# Patient Record
Sex: FEMALE | Race: ASIAN | NOT HISPANIC OR LATINO | Employment: OTHER | URBAN - METROPOLITAN AREA
[De-identification: names, ages, dates, MRNs, and addresses within clinical notes are randomized per-mention and may not be internally consistent; named-entity substitution may affect disease eponyms.]

---

## 2017-12-21 ENCOUNTER — HOSPITAL ENCOUNTER (EMERGENCY)
Facility: MEDICAL CENTER | Age: 52
End: 2017-12-21
Attending: EMERGENCY MEDICINE
Payer: MEDICARE

## 2017-12-21 VITALS
BODY MASS INDEX: 31.91 KG/M2 | RESPIRATION RATE: 16 BRPM | DIASTOLIC BLOOD PRESSURE: 84 MMHG | OXYGEN SATURATION: 94 % | HEIGHT: 63 IN | SYSTOLIC BLOOD PRESSURE: 186 MMHG | TEMPERATURE: 96.7 F | HEART RATE: 99 BPM | WEIGHT: 180.12 LBS

## 2017-12-21 DIAGNOSIS — S80.11XA CONTUSION OF RIGHT LOWER LEG, INITIAL ENCOUNTER: ICD-10-CM

## 2017-12-21 PROCEDURE — 99283 EMERGENCY DEPT VISIT LOW MDM: CPT

## 2017-12-22 NOTE — ED NOTES
.Pt D/C to home. VSS. D/C instructions given to patient. Pt educated on pain management techniques.  Pt verbalizes understanding. Pt leaves ED with no acute changes, complaints or concerns. Pt ambulated out with a steady gait and all belongings.

## 2017-12-22 NOTE — DISCHARGE INSTRUCTIONS
Contusion  A contusion is a deep bruise. Contusions happen when an injury causes bleeding under the skin. Signs of bruising include pain, puffiness (swelling), and discolored skin. The contusion may turn blue, purple, or yellow.  HOME CARE   · Put ice on the injured area.  ¨ Put ice in a plastic bag.  ¨ Place a towel between your skin and the bag.  ¨ Leave the ice on for 15-20 minutes, 03-04 times a day.  · Only take medicine as told by your doctor.  · Rest the injured area.  · If possible, raise (elevate) the injured area to lessen puffiness.  GET HELP RIGHT AWAY IF:   · You have more bruising or puffiness.  · You have pain that is getting worse.  · Your puffiness or pain is not helped by medicine.  MAKE SURE YOU:   · Understand these instructions.  · Will watch your condition.  · Will get help right away if you are not doing well or get worse.     This information is not intended to replace advice given to you by your health care provider. Make sure you discuss any questions you have with your health care provider.     Document Released: 06/05/2009 Document Revised: 03/11/2013 Document Reviewed: 10/22/2012  [employer] Patient Education ©2016 [employer]

## 2017-12-22 NOTE — ED NOTES
Pt ambulated in with [facility] and a steady gait with c/o rt lower leg numbness. Pt has good pulses bilaterally, good strength bilaterally, Pt has feeling in foot and knee, but not in the mid tib/fib region. Pt vague with symptoms but stats that the numbness started after hitting her shin on the coffee table. She awoke with numbness in her leg and called 911 because it scared her. Pt in no apparent distress at this time.

## 2017-12-22 NOTE — ED PROVIDER NOTES
ED Provider Note    CHIEF COMPLAINT  Chief Complaint   Patient presents with   • Leg Pain     Rt leg numbness on rt foot from touching it. and it is hard for the pt to move it because it is numb.       HPI  Perla Marie is a 52 y.o. female who presents After bumping her right leg on a coffee table earlier, she went to sleep for about 2 hours and then woke up and was unable to feel her right shin. She states it is not painful. She states that it doesn't hurt to walk. It feels funny when she walks because it is numb. She denies any other symptoms. She admits that she did not take her antihypertensive today. She's not sure if she took it yesterday either.    REVIEW OF SYSTEMS  See HPI for further details.     PAST MEDICAL HISTORY  Past Medical History:   Diagnosis Date   • Psychiatric disorder     depression,schziophrenic, bi-polar       FAMILY HISTORY  No family history on file.    SOCIAL HISTORY  Social History     Social History   • Marital status:      Spouse name: N/A   • Number of children: N/A   • Years of education: N/A     Social History Main Topics   • Smoking status: Never Smoker   • Smokeless tobacco: Never Used   • Alcohol use No   • Drug use: No   • Sexual activity: Not on file     Other Topics Concern   • Not on file     Social History Narrative   • No narrative on file       SURGICAL HISTORY  No past surgical history on file.    CURRENT MEDICATIONS  No current facility-administered medications for this encounter.     Current Outpatient Prescriptions:   •  ibuprofen (MOTRIN) 400 MG Tab, Take 1 Tab by mouth every 6 hours as needed., Disp: 24 Tab, Rfl: 1  •  lisinopril (PRINIVIL) 20 MG Tab, Take 20 mg by mouth every day., Disp: , Rfl:   •  metformin (GLUCOPHAGE) 850 MG Tab, Take 850 mg by mouth 2 times a day, with meals., Disp: , Rfl:   •  risperidone (RISPERDAL) 3 MG Tab, Take 3 mg by mouth 2 times a day., Disp: , Rfl:   •  aripiprazole (ABILIFY) 30 MG tablet, Take 30 mg by mouth every day.,  "Disp: , Rfl:   •  divalproex (DEPAKOTE) 250 MG TBEC, Take 250 mg by mouth every 12 hours., Disp: , Rfl:   •  duloxetine (CYMBALTA) 60 MG CPEP delayed-release capsule, Take 60 mg by mouth every day., Disp: , Rfl:   •  loxapine (LOXITANE) 10 MG capsule, Take 10 mg by mouth 2 Times a Day., Disp: , Rfl:   •  AMBIEN 5 MG PO TABS, Take  by mouth every bedtime. prn, Disp: , Rfl:       ALLERGIES  No Known Allergies    PHYSICAL EXAM  VITAL SIGNS: BP (!) 186/84   Pulse 99   Temp 35.9 °C (96.7 °F)   Resp 16   Ht 1.6 m (5' 3\")   Wt 81.7 kg (180 lb 1.9 oz)   SpO2 94%   BMI 31.91 kg/m²  @GLENYS[[id number]::@   Constitutional: Well developed, Well nourished, No acute respiratory distress, Non-toxic appearance.   HENT: Normocephalic, Atraumatic, Bilateral external ears normal, Oropharynx clear, mucous membranes are moist.  Eyes: Conjunctiva normal, No discharge. No icterus.  Neck: Normal range of motion. Supple.  Skin: Warm, Dry, No erythema, No rash. Intact without ecchymosis  Musculoskeletal: Good range of motion in all major joints. Normal gait. No pain with longitudinal compression through the right tibia. No pain with heel tap. No tenderness to the right fibula or tibia, ankle or knee.  Neurologic: Alert & oriented x 3. Sensation is intact to the right foot and right ankle, right knee, right thigh. There is a bandlike distribution over the middle of the right shin where the patient denies the ability to feel.         COURSE & MEDICAL DECISION MAKING  Pertinent Labs & Imaging studies reviewed. (See chart for details)  Patient is advised that she probably contused nerve and it should come back just fine over the next few days. She has no signs of musculoskeletal abnormality otherwise. She is comfortable going home. She understands that she must take her antihypertensive medication on a regular basis to avoid having spikes in her blood pressure that could cause a stroke. She reports that she does have plenty of medication at " home, she just forgot to take it today.     The patient will return for new or worsening symptoms and is stable at the time of discharge.        DISPOSITION:  Patient will be discharged home in stable condition.    FOLLOW UP:  Amor Barker M.D.  [id number]  [address]  [facility] [id number]  [phone]    Call in 1 week  if not improved      OUTPATIENT MEDICATIONS:  Discharge Medication List as of 12/21/2017  9:03 PM            FINAL IMPRESSION  1. Contusion of right lower leg, initial encounter               Electronically signed by: Emilie Kay, 12/21/2017 8:59 PM

## 2018-12-11 ENCOUNTER — HOSPITAL ENCOUNTER (EMERGENCY)
Facility: MEDICAL CENTER | Age: 53
End: 2018-12-12
Attending: EMERGENCY MEDICINE
Payer: MEDICARE

## 2018-12-11 DIAGNOSIS — R10.32 LLQ ABDOMINAL PAIN: ICD-10-CM

## 2018-12-11 PROCEDURE — 80053 COMPREHEN METABOLIC PANEL: CPT

## 2018-12-11 PROCEDURE — 99285 EMERGENCY DEPT VISIT HI MDM: CPT

## 2018-12-11 PROCEDURE — 81001 URINALYSIS AUTO W/SCOPE: CPT

## 2018-12-11 PROCEDURE — 83690 ASSAY OF LIPASE: CPT

## 2018-12-11 PROCEDURE — 87186 SC STD MICRODIL/AGAR DIL: CPT

## 2018-12-11 PROCEDURE — 87086 URINE CULTURE/COLONY COUNT: CPT

## 2018-12-11 PROCEDURE — 85025 COMPLETE CBC W/AUTO DIFF WBC: CPT

## 2018-12-11 PROCEDURE — 87077 CULTURE AEROBIC IDENTIFY: CPT

## 2018-12-11 PROCEDURE — 84703 CHORIONIC GONADOTROPIN ASSAY: CPT

## 2018-12-11 PROCEDURE — 36415 COLL VENOUS BLD VENIPUNCTURE: CPT

## 2018-12-11 PROCEDURE — 83605 ASSAY OF LACTIC ACID: CPT

## 2018-12-11 ASSESSMENT — PAIN SCALES - GENERAL: PAINLEVEL_OUTOF10: 2

## 2018-12-12 ENCOUNTER — APPOINTMENT (OUTPATIENT)
Dept: RADIOLOGY | Facility: MEDICAL CENTER | Age: 53
End: 2018-12-12
Attending: EMERGENCY MEDICINE
Payer: MEDICARE

## 2018-12-12 VITALS
OXYGEN SATURATION: 96 % | RESPIRATION RATE: 16 BRPM | TEMPERATURE: 96.7 F | HEART RATE: 86 BPM | HEIGHT: 62 IN | BODY MASS INDEX: 35.09 KG/M2 | DIASTOLIC BLOOD PRESSURE: 77 MMHG | SYSTOLIC BLOOD PRESSURE: 134 MMHG | WEIGHT: 190.7 LBS

## 2018-12-12 LAB
ALBUMIN SERPL BCP-MCNC: 4.2 G/DL (ref 3.2–4.9)
ALBUMIN/GLOB SERPL: 1.1 G/DL
ALP SERPL-CCNC: 70 U/L (ref 30–99)
ALT SERPL-CCNC: 20 U/L (ref 2–50)
ANION GAP SERPL CALC-SCNC: 11 MMOL/L (ref 0–11.9)
APPEARANCE UR: ABNORMAL
AST SERPL-CCNC: 21 U/L (ref 12–45)
BACTERIA #/AREA URNS HPF: ABNORMAL /HPF
BASOPHILS # BLD AUTO: 0.6 % (ref 0–1.8)
BASOPHILS # BLD: 0.06 K/UL (ref 0–0.12)
BILIRUB SERPL-MCNC: 0.8 MG/DL (ref 0.1–1.5)
BILIRUB UR QL STRIP.AUTO: NEGATIVE
BUN SERPL-MCNC: 21 MG/DL (ref 8–22)
CALCIUM SERPL-MCNC: 8.7 MG/DL (ref 8.4–10.2)
CHLORIDE SERPL-SCNC: 101 MMOL/L (ref 96–112)
CO2 SERPL-SCNC: 23 MMOL/L (ref 20–33)
COLOR UR: YELLOW
CREAT SERPL-MCNC: 0.94 MG/DL (ref 0.5–1.4)
EOSINOPHIL # BLD AUTO: 0.11 K/UL (ref 0–0.51)
EOSINOPHIL NFR BLD: 1.1 % (ref 0–6.9)
EPI CELLS #/AREA URNS HPF: ABNORMAL /HPF
ERYTHROCYTE [DISTWIDTH] IN BLOOD BY AUTOMATED COUNT: 40 FL (ref 35.9–50)
GLOBULIN SER CALC-MCNC: 3.9 G/DL (ref 1.9–3.5)
GLUCOSE SERPL-MCNC: 248 MG/DL (ref 65–99)
GLUCOSE UR STRIP.AUTO-MCNC: 500 MG/DL
HCG SERPL QL: NEGATIVE
HCT VFR BLD AUTO: 45.7 % (ref 37–47)
HGB BLD-MCNC: 15.3 G/DL (ref 12–16)
IMM GRANULOCYTES # BLD AUTO: 0.08 K/UL (ref 0–0.11)
IMM GRANULOCYTES NFR BLD AUTO: 0.8 % (ref 0–0.9)
KETONES UR STRIP.AUTO-MCNC: 15 MG/DL
LACTATE BLD-SCNC: 3.3 MMOL/L (ref 0.5–2)
LACTATE BLD-SCNC: 3.9 MMOL/L (ref 0.5–2)
LEUKOCYTE ESTERASE UR QL STRIP.AUTO: ABNORMAL
LIPASE SERPL-CCNC: 34 U/L (ref 7–58)
LYMPHOCYTES # BLD AUTO: 4.69 K/UL (ref 1–4.8)
LYMPHOCYTES NFR BLD: 45.1 % (ref 22–41)
MCH RBC QN AUTO: 30.2 PG (ref 27–33)
MCHC RBC AUTO-ENTMCNC: 33.5 G/DL (ref 33.6–35)
MCV RBC AUTO: 90.3 FL (ref 81.4–97.8)
MICRO URNS: ABNORMAL
MONOCYTES # BLD AUTO: 0.6 K/UL (ref 0–0.85)
MONOCYTES NFR BLD AUTO: 5.8 % (ref 0–13.4)
NEUTROPHILS # BLD AUTO: 4.85 K/UL (ref 2–7.15)
NEUTROPHILS NFR BLD: 46.6 % (ref 44–72)
NITRITE UR QL STRIP.AUTO: NEGATIVE
NRBC # BLD AUTO: 0 K/UL
NRBC BLD-RTO: 0 /100 WBC
PH UR STRIP.AUTO: 5.5 [PH]
PLATELET # BLD AUTO: 251 K/UL (ref 164–446)
PMV BLD AUTO: 9.5 FL (ref 9–12.9)
POTASSIUM SERPL-SCNC: 3.5 MMOL/L (ref 3.6–5.5)
PROT SERPL-MCNC: 8.1 G/DL (ref 6–8.2)
PROT UR QL STRIP: 30 MG/DL
RBC # BLD AUTO: 5.06 M/UL (ref 4.2–5.4)
RBC # URNS HPF: ABNORMAL /HPF
RBC UR QL AUTO: ABNORMAL
SODIUM SERPL-SCNC: 135 MMOL/L (ref 135–145)
SP GR UR STRIP.AUTO: 1.02
WBC # BLD AUTO: 10.4 K/UL (ref 4.8–10.8)
WBC #/AREA URNS HPF: ABNORMAL /HPF

## 2018-12-12 PROCEDURE — 700102 HCHG RX REV CODE 250 W/ 637 OVERRIDE(OP): Performed by: EMERGENCY MEDICINE

## 2018-12-12 PROCEDURE — A9270 NON-COVERED ITEM OR SERVICE: HCPCS | Performed by: EMERGENCY MEDICINE

## 2018-12-12 PROCEDURE — 96374 THER/PROPH/DIAG INJ IV PUSH: CPT

## 2018-12-12 PROCEDURE — 74177 CT ABD & PELVIS W/CONTRAST: CPT

## 2018-12-12 PROCEDURE — 700117 HCHG RX CONTRAST REV CODE 255: Performed by: EMERGENCY MEDICINE

## 2018-12-12 PROCEDURE — 700105 HCHG RX REV CODE 258: Performed by: EMERGENCY MEDICINE

## 2018-12-12 PROCEDURE — 700111 HCHG RX REV CODE 636 W/ 250 OVERRIDE (IP): Performed by: EMERGENCY MEDICINE

## 2018-12-12 PROCEDURE — 83605 ASSAY OF LACTIC ACID: CPT

## 2018-12-12 RX ORDER — MORPHINE SULFATE 4 MG/ML
4 INJECTION, SOLUTION INTRAMUSCULAR; INTRAVENOUS ONCE
Status: COMPLETED | OUTPATIENT
Start: 2018-12-12 | End: 2018-12-12

## 2018-12-12 RX ORDER — METRONIDAZOLE 500 MG/1
500 TABLET ORAL 3 TIMES DAILY
Qty: 21 TAB | Refills: 0 | Status: SHIPPED | OUTPATIENT
Start: 2018-12-12 | End: 2018-12-19

## 2018-12-12 RX ORDER — METRONIDAZOLE 500 MG/1
500 TABLET ORAL ONCE
Status: COMPLETED | OUTPATIENT
Start: 2018-12-12 | End: 2018-12-12

## 2018-12-12 RX ORDER — SODIUM CHLORIDE 9 MG/ML
1000 INJECTION, SOLUTION INTRAVENOUS ONCE
Status: COMPLETED | OUTPATIENT
Start: 2018-12-12 | End: 2018-12-12

## 2018-12-12 RX ORDER — CIPROFLOXACIN 500 MG/1
500 TABLET, FILM COATED ORAL 2 TIMES DAILY
Qty: 14 TAB | Refills: 0 | Status: SHIPPED | OUTPATIENT
Start: 2018-12-12 | End: 2018-12-19

## 2018-12-12 RX ORDER — CIPROFLOXACIN 500 MG/1
500 TABLET, FILM COATED ORAL ONCE
Status: COMPLETED | OUTPATIENT
Start: 2018-12-12 | End: 2018-12-12

## 2018-12-12 RX ADMIN — MORPHINE SULFATE 4 MG: 4 INJECTION INTRAVENOUS at 00:14

## 2018-12-12 RX ADMIN — CIPROFLOXACIN 500 MG: 500 TABLET, FILM COATED ORAL at 02:03

## 2018-12-12 RX ADMIN — METRONIDAZOLE 500 MG: 500 TABLET ORAL at 02:03

## 2018-12-12 RX ADMIN — SODIUM CHLORIDE 1000 ML: 9 INJECTION, SOLUTION INTRAVENOUS at 00:12

## 2018-12-12 RX ADMIN — IOHEXOL 100 ML: 350 INJECTION, SOLUTION INTRAVENOUS at 01:29

## 2018-12-12 ASSESSMENT — PAIN SCALES - GENERAL
PAINLEVEL_OUTOF10: 4
PAINLEVEL_OUTOF10: 0

## 2018-12-12 NOTE — ED PROVIDER NOTES
"ED Provider Note    CHIEF COMPLAINT  Chief Complaint   Patient presents with   • LLQ Pain     started around 2100 tonight, described as \"sharp\"       HPI  Perla Marie is a 53 y.o. female who presents with a chief complaint of left lower quadrant pain.  She reports she was in her baseline state of health until approximately 9:00 tonight, 2 hours prior to arrival, when she developed sharp left lower quadrant pain.  The pain does not radiate but is associated with dysuria and hematuria.  She has not taken any medication for her symptoms.  She denies any fevers or chills, chest pain or shortness of breath, nausea or vomiting, diarrhea or constipation.  She has not had a menstrual period in 2 years but is concerned that she may be pregnant.  No vaginal discharge.  No melena or hematochezia.    REVIEW OF SYSTEMS  See HPI for further details.  Abdominal pain.  Dysuria.  All other systems are negative.     PAST MEDICAL HISTORY   has a past medical history of Diabetes (HCC); Hypertension; and Psychiatric disorder.    SOCIAL HISTORY  Social History     Social History Main Topics   • Smoking status: Never Smoker   • Smokeless tobacco: Never Used   • Alcohol use No   • Drug use: No   • Sexual activity: Not on file       SURGICAL HISTORY  patient denies any surgical history    CURRENT MEDICATIONS  Home Medications    **Home medications have not yet been reviewed for this encounter**         ALLERGIES  No Known Allergies    PHYSICAL EXAM  VITAL SIGNS: BP (!) 178/79   Pulse 87   Temp 35.9 °C (96.7 °F) (Oral)   Resp 12   Ht 1.575 m (5' 2\")   Wt 86.5 kg (190 lb 11.2 oz)   SpO2 95%   BMI 34.88 kg/m²    Pulse ox interpretation: I interpret this pulse ox as normal.  Constitutional: Alert in no apparent distress.  HENT: No signs of trauma, Bilateral external ears normal, Nose normal.  Dry mucous membranes.  Eyes: Pupils are equal and reactive, Conjunctiva normal, Non-icteric.   Neck: Normal range of motion, No tenderness, " Supple, No stridor.   Lymphatic: No lymphadenopathy noted.   Cardiovascular: Regular rate and rhythm, no murmurs.   Thorax & Lungs: Normal breath sounds, No respiratory distress, No wheezing, No chest tenderness.   Abdomen: Bowel sounds normal, Soft, left lower quadrant tenderness to palpation, No masses, No pulsatile masses. No peritoneal signs.  Skin: Warm, Dry, No erythema, No rash.   Back: No bony tenderness, left-sided CVA tenderness.   Extremities: Intact distal pulses, No edema, No tenderness, No cyanosis.  Musculoskeletal: Good range of motion in all major joints. No tenderness to palpation or major deformities noted.   Neurologic: Alert, Normal motor function, Normal sensory function, No focal deficits noted.   Psychiatric: Affect normal, Judgment normal, Mood normal.       DIAGNOSTIC STUDIES / PROCEDURES    LABS  CBC  CMP  Lipase  Urinalysis  HCG  Lactic acid x2    RADIOLOGY  CT abdomen pelvis with contrast    COURSE & MEDICAL DECISION MAKING  Pertinent Labs & Imaging studies reviewed. (See chart for details)  This is a 53-year-old female with a history of diabetes and hypertension who is here with a chief complaint of left lower quadrant pain.  Differential diagnosis includes, but is not limited to, diverticulitis, cystitis, colitis, ovarian pathology, less likely ectopic pregnancy.  Patient arrives hypertensive but afebrile with otherwise normal vital signs.  She appears dehydrated with dry mucous membranes but nontoxic.  Her physical exam is remarkable for left lower quadrant tenderness to palpation.  She does have mild left CVA tenderness.  The remainder of her physical exam is unremarkable.  Patient given pain medication and IV fluids started as below.    CBC is without leukocytosis or anemia.  No neutrophilic predominance or bandemia.  Electrolytes are largely normal with a very mild hypokalemia to 3.5.  Blood glucose is 248 and the patient does have a history of diabetes.  Lactic acid is noted to be  elevated to 3.9.  The patient does have ketones in her urine and her urine suggests infection with moderate leukocyte esterase and many bacteria as well as  white blood cells but the specimen is contaminated.  The patient's hCG is negative.    A CT was performed to evaluate for potential diverticulitis.  CT revealed diffuse wall thickening of the distal sigmoid colon and rectum relating either to colitis or proctitis.  Also noted was cholelithiasis.    I discussed the CT read with Dr. Smith, radiologist, to specifically ask him to evaluate for signs of mesenteric ischemia on CT.  He reportedly was able to visualize patent vasculature and felt that the CT was not consistent with mesenteric ischemia.  Although the patient's lactic acid is 3.9, she does not have pain out of proportion to her abdominal exam.  Her symptoms are more consistent with colitis than mesenteric ischemia.  Her urinalysis could potentially suggest infection but is obviously a contaminated specimen.  Regardless, I am going to treat the patient with ciprofloxacin and Flagyl for colitis and these medications will cover a urinary tract infection if this is indeed the underlying etiology.    Patient does have a history of diabetes and currently takes metformin.  She will need to follow-up with her primary care physician as her blood glucose is elevated today.  Clinically, I doubt DKA or HHS, especially with radiographic findings consistent with patient's complaint.  The ketones in her urine likely represent dehydration as evidenced by dry mucous membranes.    Upon reevaluation, the patient is resting comfortably.  She reports that her pain has completely resolved.  A repeat abdominal exam continues to be benign.  Abdomen is soft, nontender, no masses, no rebound, guarding, or rigidity.  No peritoneal signs.  Patient is a good candidate for trial of outpatient therapy.  She does have a primary care physician and I have asked her to call his office  later today in order to make a follow-up appointment within 48 hours.  She will be given 1 dose of Cipro/Flagyl prior to discharge.  She will be given a prescription for a 7-day course of the same.  If she develops any new or worsening symptoms she is to return immediately to the ER.  Reminded to stay hydrated with clear liquids.  A repeat lactic acid was improved although not normalized.  This will likely continue to improve with oral rehydration at home.    The patient lives at home with her brother and has a son who will be able to drive her to her doctor's appointment.    The patient will not drink alcohol nor drive with prescribed medications. The patient will return for worsening symptoms and is stable at the time of discharge. The patient verbalizes understanding and will comply.    HYDRATION  HYDRATION: Based on the patient's presentation of Dehydration the patient was given IV fluids. IV Hydration was used because oral hydration was not as rapid as required. Upon recheck following hydration, the patient was Improved.    FINAL IMPRESSION  1.  Colitis  2.  Hyperglycemia    Electronically signed by: Ferdinand Quiroz, 12/11/2018 11:25 PM

## 2018-12-12 NOTE — ED NOTES
Clarified c/o why pt is here  Pt having pain to L abdomen Quad area and L L leg pain  Wanting US to see if she is pregnant   MD explained to pt the bld work would be done for this concern  Pt verbally understands   SL placed L AC w/ bld drawn  Both UA and bld sent to lab  Comfort measures given  Pt in NAD at present

## 2018-12-12 NOTE — ED NOTES
Pt BIB [facility]  C/o L L leg pain that starts from just above L ankle area and rads up to below knee  No abnormalities or swelling noted  Pt was able to stand strong and steady on legs w/out difficulty   Comfort measures given  MD at  for evaluation

## 2018-12-12 NOTE — DISCHARGE INSTRUCTIONS
You were seen in the ER for abdominal pain.  Your CT scan suggests that you have an inflammation or infection in your colon or rectum which is part of your large intestine.  I am giving you a prescription for antibiotics and I would like you to take these as directed.  I am also including the contact information for your primary care physician, please call them later today to make a follow-up appointment.  If you develop any new or worsening symptoms please return immediately to the ER.

## 2018-12-12 NOTE — ED NOTES
Reviewed discharge instructions and printed prescriptions for Flagyl and Cipro w/ pt, attempted to have pt repeat back information to confirm understanding to information provided including follow up care.  Pt stated would be walking home from ED.  Pt instructed unable to walk home r/t safety concerns and having received Narcotic.  Pt states has tried to call family, unable to reach any family members.  Pt ambulated to [facility], continues to attempt to contact family.  Pt again instructed not to walk home.

## 2018-12-12 NOTE — ED TRIAGE NOTES
"Chief Complaint   Patient presents with   • LLQ Pain     started around 2100 tonight, described as \"sharp\"     BP (!) 178/79   Pulse 87   Temp 35.9 °C (96.7 °F) (Oral)   Resp 12   Ht 1.575 m (5' 2\")   Wt 86.5 kg (190 lb 11.2 oz)   SpO2 95%   BMI 34.88 kg/m²     Pt BIB [facility] from home for c/o LLQ pain started around 2100 tonight.  Pt requesting an US  "

## 2018-12-14 LAB
BACTERIA UR CULT: ABNORMAL
BACTERIA UR CULT: ABNORMAL
SIGNIFICANT IND 70042: ABNORMAL
SITE SITE: ABNORMAL
SOURCE SOURCE: ABNORMAL

## 2018-12-14 NOTE — ED NOTES
ED Positive Culture Follow-up/Notification Note:    Date: 12/14/18     Patient seen in the ED on 12/11/2018 for dysuria and hematuria.   1. LLQ abdominal pain       Discharge Medication List as of 12/12/2018  2:59 AM      START taking these medications    Details   ciprofloxacin (CIPRO) 500 MG Tab Take 1 Tab by mouth 2 times a day for 7 days., Disp-14 Tab, R-0, Print Rx Paper      metroNIDAZOLE (FLAGYL) 500 MG Tab Take 1 Tab by mouth 3 times a day for 7 days., Disp-21 Tab, R-0, Print Rx Paper             Allergies: Patient has no known allergies.     Vitals:    12/12/18 0110 12/12/18 0127 12/12/18 0130 12/12/18 0200   BP: 134/77      Pulse: 83 88 86 86   Resp: 16      Temp:       TempSrc:       SpO2: 93% 93% 92% 96%   Weight:       Height:           Final cultures:   Results     Procedure Component Value Units Date/Time    URINE CULTURE(NEW) [[id number]]  (Abnormal)  (Susceptibility) Collected:  12/11/18 2345    Order Status:  Completed Specimen:  Urine Updated:  12/14/18 0850     Significant Indicator POS (POS)     Source UR     Site --     Urine Culture -- (A)      Escherichia coli  >100,000 cfu/mL   (A)    Narrative:       TEST Urine Culture WAS CANCELLED, 12/12/18 00:28 Recollect: Not clean catch.  Notified:Fatou MCDERMOTT 12/12/2018  00:28    Culture & Susceptibility     ESCHERICHIA COLI     Antibiotic Sensitivity Microscan Unit Status    Ampicillin Sensitive <=8 mcg/mL Final    Method: SENSITIVITY, SYLVIE    Cefepime Sensitive <=8 mcg/mL Final    Method: SENSITIVITY, SYLVIE    Cefotaxime Sensitive <=2 mcg/mL Final    Method: SENSITIVITY, SYLVIE    Cefotetan Sensitive <=16 mcg/mL Final    Method: SENSITIVITY, SYLVIE    Ceftazidime Sensitive <=1 mcg/mL Final    Method: SENSITIVITY, SYLVIE    Ceftriaxone Sensitive <=8 mcg/mL Final    Method: SENSITIVITY, SYLVIE    Cefuroxime Sensitive <=4 mcg/mL Final    Method: SENSITIVITY, SYLVIE    Cephalothin Sensitive <=8 mcg/mL Final    Method: SENSITIVITY, SYLVIE    Ciprofloxacin Sensitive <=1 mcg/mL  Final    Method: SENSITIVITY, SYLVIE    Gentamicin Sensitive <=4 mcg/mL Final    Method: SENSITIVITY, SYLVIE    Levofloxacin Sensitive <=2 mcg/mL Final    Method: SENSITIVITY, SYLVIE    Nitrofurantoin Sensitive <=32 mcg/mL Final    Method: SENSITIVITY, SYLVIE    Pip/Tazobactam Sensitive <=16 mcg/mL Final    Method: SENSITIVITY, SYLVIE    Piperacillin Sensitive <=16 mcg/mL Final    Method: SENSITIVITY, SYLVIE    Tigecycline Sensitive <=2 mcg/mL Final    Method: SENSITIVITY, SYLVIE    Tobramycin Sensitive <=4 mcg/mL Final    Method: SENSITIVITY, SYLVIE    Trimeth/Sulfa Sensitive <=2/38 mcg/mL Final    Method: SENSITIVITY, SYLVIE                       URINALYSIS,CULTURE IF INDICATED [[id number]]  (Abnormal) Collected:  12/11/18 2345    Order Status:  Completed Specimen:  Urine from Urine, Clean Catch Updated:  12/12/18 0014     Color Yellow     Character Turbid (A)     Specific Gravity 1.025     Ph 5.5     Glucose 500 (A) mg/dL      Ketones 15 (A) mg/dL      Protein 30 (A) mg/dL      Bilirubin Negative     Nitrite Negative     Leukocyte Esterase Moderate (A)     Occult Blood Trace (A)     Micro Urine Req Microscopic    URINE CULTURE(NEW) [[id number]]     Order Status:  Canceled     URINE CULTURE(NEW) [[id number]] Collected:  12/11/18 0000    Order Status:  Canceled           Plan:   Appropriate antibiotic therapy prescribed. No changes required based upon culture result.    July Juarez

## 2018-12-23 ENCOUNTER — HOSPITAL ENCOUNTER (EMERGENCY)
Facility: MEDICAL CENTER | Age: 53
End: 2018-12-23
Attending: EMERGENCY MEDICINE
Payer: MEDICARE

## 2018-12-23 VITALS
TEMPERATURE: 99 F | WEIGHT: 189 LBS | HEART RATE: 99 BPM | HEIGHT: 62 IN | DIASTOLIC BLOOD PRESSURE: 88 MMHG | OXYGEN SATURATION: 96 % | BODY MASS INDEX: 34.78 KG/M2 | SYSTOLIC BLOOD PRESSURE: 161 MMHG | RESPIRATION RATE: 20 BRPM

## 2018-12-23 DIAGNOSIS — R51.9 NONINTRACTABLE HEADACHE, UNSPECIFIED CHRONICITY PATTERN, UNSPECIFIED HEADACHE TYPE: ICD-10-CM

## 2018-12-23 DIAGNOSIS — R73.9 HYPERGLYCEMIA: ICD-10-CM

## 2018-12-23 LAB
ALBUMIN SERPL BCP-MCNC: 4.1 G/DL (ref 3.2–4.9)
ALBUMIN/GLOB SERPL: 1.3 G/DL
ALP SERPL-CCNC: 72 U/L (ref 30–99)
ALT SERPL-CCNC: 20 U/L (ref 2–50)
ANION GAP SERPL CALC-SCNC: 15 MMOL/L (ref 0–11.9)
APTT PPP: 27 SEC (ref 24.7–36)
AST SERPL-CCNC: 15 U/L (ref 12–45)
BASOPHILS # BLD AUTO: 0.9 % (ref 0–1.8)
BASOPHILS # BLD: 0.06 K/UL (ref 0–0.12)
BILIRUB SERPL-MCNC: 0.5 MG/DL (ref 0.1–1.5)
BNP SERPL-MCNC: 6 PG/ML (ref 0–100)
BUN SERPL-MCNC: 18 MG/DL (ref 8–22)
CALCIUM SERPL-MCNC: 9.6 MG/DL (ref 8.5–10.5)
CHLORIDE SERPL-SCNC: 100 MMOL/L (ref 96–112)
CO2 SERPL-SCNC: 23 MMOL/L (ref 20–33)
CREAT SERPL-MCNC: 0.91 MG/DL (ref 0.5–1.4)
EKG IMPRESSION: NORMAL
EOSINOPHIL # BLD AUTO: 0.04 K/UL (ref 0–0.51)
EOSINOPHIL NFR BLD: 0.6 % (ref 0–6.9)
ERYTHROCYTE [DISTWIDTH] IN BLOOD BY AUTOMATED COUNT: 40.5 FL (ref 35.9–50)
GLOBULIN SER CALC-MCNC: 3.2 G/DL (ref 1.9–3.5)
GLUCOSE BLD-MCNC: 299 MG/DL (ref 65–99)
GLUCOSE SERPL-MCNC: 304 MG/DL (ref 65–99)
HCT VFR BLD AUTO: 43.9 % (ref 37–47)
HGB BLD-MCNC: 14.7 G/DL (ref 12–16)
IMM GRANULOCYTES # BLD AUTO: 0.02 K/UL (ref 0–0.11)
IMM GRANULOCYTES NFR BLD AUTO: 0.3 % (ref 0–0.9)
INR PPP: 0.97 (ref 0.87–1.13)
LIPASE SERPL-CCNC: 23 U/L (ref 11–82)
LYMPHOCYTES # BLD AUTO: 2.86 K/UL (ref 1–4.8)
LYMPHOCYTES NFR BLD: 41.6 % (ref 22–41)
MCH RBC QN AUTO: 30.5 PG (ref 27–33)
MCHC RBC AUTO-ENTMCNC: 33.5 G/DL (ref 33.6–35)
MCV RBC AUTO: 91.1 FL (ref 81.4–97.8)
MONOCYTES # BLD AUTO: 0.6 K/UL (ref 0–0.85)
MONOCYTES NFR BLD AUTO: 8.7 % (ref 0–13.4)
NEUTROPHILS # BLD AUTO: 3.29 K/UL (ref 2–7.15)
NEUTROPHILS NFR BLD: 47.9 % (ref 44–72)
NRBC # BLD AUTO: 0 K/UL
NRBC BLD-RTO: 0 /100 WBC
PLATELET # BLD AUTO: 232 K/UL (ref 164–446)
PMV BLD AUTO: 9.5 FL (ref 9–12.9)
POTASSIUM SERPL-SCNC: 3.8 MMOL/L (ref 3.6–5.5)
PROT SERPL-MCNC: 7.3 G/DL (ref 6–8.2)
PROTHROMBIN TIME: 13 SEC (ref 12–14.6)
RBC # BLD AUTO: 4.82 M/UL (ref 4.2–5.4)
SODIUM SERPL-SCNC: 138 MMOL/L (ref 135–145)
TROPONIN I SERPL-MCNC: <0.01 NG/ML (ref 0–0.04)
WBC # BLD AUTO: 6.9 K/UL (ref 4.8–10.8)

## 2018-12-23 PROCEDURE — 85025 COMPLETE CBC W/AUTO DIFF WBC: CPT

## 2018-12-23 PROCEDURE — 36415 COLL VENOUS BLD VENIPUNCTURE: CPT

## 2018-12-23 PROCEDURE — 83880 ASSAY OF NATRIURETIC PEPTIDE: CPT

## 2018-12-23 PROCEDURE — 84484 ASSAY OF TROPONIN QUANT: CPT

## 2018-12-23 PROCEDURE — 82962 GLUCOSE BLOOD TEST: CPT

## 2018-12-23 PROCEDURE — 80053 COMPREHEN METABOLIC PANEL: CPT

## 2018-12-23 PROCEDURE — 85610 PROTHROMBIN TIME: CPT

## 2018-12-23 PROCEDURE — 83690 ASSAY OF LIPASE: CPT

## 2018-12-23 PROCEDURE — 85730 THROMBOPLASTIN TIME PARTIAL: CPT

## 2018-12-23 PROCEDURE — 99284 EMERGENCY DEPT VISIT MOD MDM: CPT

## 2018-12-23 PROCEDURE — 93005 ELECTROCARDIOGRAM TRACING: CPT | Performed by: EMERGENCY MEDICINE

## 2018-12-23 PROCEDURE — 93005 ELECTROCARDIOGRAM TRACING: CPT

## 2018-12-23 ASSESSMENT — PAIN SCALES - GENERAL: PAINLEVEL_OUTOF10: 0

## 2018-12-24 NOTE — ED PROVIDER NOTES
"ED Provider Note    CHIEF COMPLAINT  Chief Complaint   Patient presents with   • Dizziness     reports that she woke this evening with Head \"fullness\" and and nausea. EMS was called. noted HTN and hyperglycemia. has been having some vision changes over the last month. reports S/Sx mostly resolved.       HPI  Perla Marie is a 53 y.o. female who presents for evaluation of completely resolved symptoms that include \"thickening\" of his head, blurry vision and self-reported anxiety, all present upon waking up, quickly resolved but she presents here for evaluation.  No focal weakness numbness or tingling, no vomiting, no abdominal pain or chest pain or shortness of breath, no other complaints whatsoever offered by the patient at this time.    REVIEW OF SYSTEMS  Negative for fever, rash, chest pain, dyspnea, abdominal pain, nausea, vomiting, diarrhea, focal weakness, focal numbness, focal tingling, back pain. All other systems are negative.     PAST MEDICAL HISTORY  Past Medical History:   Diagnosis Date   • Diabetes (HCC)    • Hypertension    • Psychiatric disorder     depression,schziophrenic, bi-polar       FAMILY HISTORY  No family history on file.    SOCIAL HISTORY  Social History   Substance Use Topics   • Smoking status: Never Smoker   • Smokeless tobacco: Never Used   • Alcohol use No       SURGICAL HISTORY  History reviewed. No pertinent surgical history.    CURRENT MEDICATIONS  I personally reviewed the medication list in the charting documentation.     ALLERGIES  No Known Allergies    MEDICAL RECORD  I have reviewed patient's medical record and pertinent results are listed above.      PHYSICAL EXAM  VITAL SIGNS: BP (!) 161/88   Pulse 99   Temp 37.2 °C (99 °F) (Temporal)   Resp 20   Ht 1.575 m (5' 2\")   Wt 85.7 kg (189 lb)   SpO2 96%   BMI 34.57 kg/m²    Constitutional: Well appearing patient in no acute distress.  Not toxic, nor ill in appearance.  HENT: Mucus membranes moist.    Eyes: No scleral " icterus. Normal conjunctiva   Neck: Supple, comfortable, nonpainful range of motion.   Cardiovascular: Regular heart rate and rhythm.   Thorax & Lungs: Chest is nontender.  Lungs are clear to auscultation with good air movement bilaterally.  No wheeze, rhonchi, nor rales.   Abdomen: Soft, with no tenderness, rebound nor guarding.  No mass or pulsatile mass appreciated.  Skin: Warm, dry. No rash appreciated  Extremities/Musculoskeletal: No sign of trauma. No asymmetric calf tenderness, erythema or edema. Normal range of motion   Neurologic: Alert & oriented. No focal deficits observed.   Psychiatric: Normal affect appropriate for the clinical situation.    DIAGNOSTIC STUDIES / PROCEDURES    LABS  Results for orders placed or performed during the hospital encounter of 12/23/18   Troponin   Result Value Ref Range    Troponin I <0.01 0.00 - 0.04 ng/mL   Btype Natriuretic Peptide   Result Value Ref Range    B Natriuretic Peptide 6 0 - 100 pg/mL   CBC with Differential   Result Value Ref Range    WBC 6.9 4.8 - 10.8 K/uL    RBC 4.82 4.20 - 5.40 M/uL    Hemoglobin 14.7 12.0 - 16.0 g/dL    Hematocrit 43.9 37.0 - 47.0 %    MCV 91.1 81.4 - 97.8 fL    MCH 30.5 27.0 - 33.0 pg    MCHC 33.5 (L) 33.6 - 35.0 g/dL    RDW 40.5 35.9 - 50.0 fL    Platelet Count 232 164 - 446 K/uL    MPV 9.5 9.0 - 12.9 fL    Neutrophils-Polys 47.90 44.00 - 72.00 %    Lymphocytes 41.60 (H) 22.00 - 41.00 %    Monocytes 8.70 0.00 - 13.40 %    Eosinophils 0.60 0.00 - 6.90 %    Basophils 0.90 0.00 - 1.80 %    Immature Granulocytes 0.30 0.00 - 0.90 %    Nucleated RBC 0.00 /100 WBC    Neutrophils (Absolute) 3.29 2.00 - 7.15 K/uL    Lymphs (Absolute) 2.86 1.00 - 4.80 K/uL    Monos (Absolute) 0.60 0.00 - 0.85 K/uL    Eos (Absolute) 0.04 0.00 - 0.51 K/uL    Baso (Absolute) 0.06 0.00 - 0.12 K/uL    Immature Granulocytes (abs) 0.02 0.00 - 0.11 K/uL    NRBC (Absolute) 0.00 K/uL   Complete Metabolic Panel (CMP)   Result Value Ref Range    Sodium 138 135 - 145 mmol/L     Potassium 3.8 3.6 - 5.5 mmol/L    Chloride 100 96 - 112 mmol/L    Co2 23 20 - 33 mmol/L    Anion Gap 15.0 (H) 0.0 - 11.9    Glucose 304 (H) 65 - 99 mg/dL    Bun 18 8 - 22 mg/dL    Creatinine 0.91 0.50 - 1.40 mg/dL    Calcium 9.6 8.5 - 10.5 mg/dL    AST(SGOT) 15 12 - 45 U/L    ALT(SGPT) 20 2 - 50 U/L    Alkaline Phosphatase 72 30 - 99 U/L    Total Bilirubin 0.5 0.1 - 1.5 mg/dL    Albumin 4.1 3.2 - 4.9 g/dL    Total Protein 7.3 6.0 - 8.2 g/dL    Globulin 3.2 1.9 - 3.5 g/dL    A-G Ratio 1.3 g/dL   Prothrombin Time   Result Value Ref Range    PT 13.0 12.0 - 14.6 sec    INR 0.97 0.87 - 1.13   APTT   Result Value Ref Range    APTT 27.0 24.7 - 36.0 sec   Lipase   Result Value Ref Range    Lipase 23 11 - 82 U/L   ESTIMATED GFR   Result Value Ref Range    GFR If African American >60 >60 mL/min/1.73 m 2    GFR If Non African American >60 >60 mL/min/1.73 m 2   ACCU-CHEK GLUCOSE   Result Value Ref Range    Glucose - Accu-Ck 299 (H) 65 - 99 mg/dL   EKG   Result Value Ref Range    Report       [facility] Emergency Dept.    Test Date:  2018  Pt Name:    DEAN CAMPOS                   Department: ER  MRN:        [id number]                      Room:        14  Gender:     Female                       Technician: [id number]  :        1965                   Requested By:ER TRIAGE PROTOCOL  Order #:    [id number]                    Reading MD:    Measurements  Intervals                                Axis  Rate:       94                           P:          18  GA:         140                          QRS:        -50  QRSD:       82                           T:          11  QT:         344  QTc:        431    Interpretive Statements  SINUS RHYTHM  LAD, CONSIDER LEFT ANTERIOR FASCICULAR BLOCK  LOW VOLTAGE IN FRONTAL LEADS  LATE PRECORDIAL R/S TRANSITION  Compared to ECG 2011 18:26:33  Left-axis deviation no longer present  T-wave abnormality no longer present          COURSE & MEDICAL DECISION  MAKING  I have reviewed any medical record information, laboratory studies and radiographic results as noted above.    Encounter Summary: This is a 53 y.o. female with symptoms that seem to involve a headache and blurry vision in a subsequent anxiety reaction, all resolved, her exam is unremarkable.  She is hyperglycemic but no other significant acute abnormalities on her blood work.  The patient appears well, differential included things a dehydration and anemia and DKA, all of been ruled out.  The patient did request to receive hemodialysis although she has never had any renal impairment or dialysis in the past, I am not sure why she is requesting that but at this time she is stable and appropriate for discharge with outpatient follow-up and strict return instructions have been discussed.       DISPOSITION: Discharged home in stable condition      FINAL IMPRESSION  1. Nonintractable headache, unspecified chronicity pattern, unspecified headache type    2. Hyperglycemia           This dictation was created using voice recognition software. The accuracy of the dictation is limited to the abilities of the software. I expect there may be some errors of grammar and possibly content. The nursing notes were reviewed and certain aspects of this information were incorporated into this note.    Electronically signed by: Freddie Shah, 12/23/2018 8:05 PM

## 2018-12-29 ENCOUNTER — HOSPITAL ENCOUNTER (EMERGENCY)
Facility: MEDICAL CENTER | Age: 53
End: 2018-12-30
Attending: EMERGENCY MEDICINE
Payer: MEDICARE

## 2018-12-29 ENCOUNTER — APPOINTMENT (OUTPATIENT)
Dept: RADIOLOGY | Facility: MEDICAL CENTER | Age: 53
End: 2018-12-29
Attending: EMERGENCY MEDICINE
Payer: MEDICARE

## 2018-12-29 DIAGNOSIS — G51.0 RIGHT-SIDED BELL'S PALSY: ICD-10-CM

## 2018-12-29 LAB
ALBUMIN SERPL BCP-MCNC: 4.1 G/DL (ref 3.2–4.9)
ALBUMIN/GLOB SERPL: 1.3 G/DL
ALP SERPL-CCNC: 56 U/L (ref 30–99)
ALT SERPL-CCNC: 20 U/L (ref 2–50)
ANION GAP SERPL CALC-SCNC: 11 MMOL/L (ref 0–11.9)
APTT PPP: 29.1 SEC (ref 24.7–36)
AST SERPL-CCNC: 14 U/L (ref 12–45)
BASOPHILS # BLD AUTO: 0.8 % (ref 0–1.8)
BASOPHILS # BLD: 0.08 K/UL (ref 0–0.12)
BILIRUB SERPL-MCNC: 0.5 MG/DL (ref 0.1–1.5)
BUN SERPL-MCNC: 22 MG/DL (ref 8–22)
CALCIUM SERPL-MCNC: 9.2 MG/DL (ref 8.5–10.5)
CHLORIDE SERPL-SCNC: 103 MMOL/L (ref 96–112)
CO2 SERPL-SCNC: 24 MMOL/L (ref 20–33)
CREAT SERPL-MCNC: 0.76 MG/DL (ref 0.5–1.4)
EOSINOPHIL # BLD AUTO: 0.17 K/UL (ref 0–0.51)
EOSINOPHIL NFR BLD: 1.8 % (ref 0–6.9)
ERYTHROCYTE [DISTWIDTH] IN BLOOD BY AUTOMATED COUNT: 40.2 FL (ref 35.9–50)
GLOBULIN SER CALC-MCNC: 3.2 G/DL (ref 1.9–3.5)
GLUCOSE SERPL-MCNC: 224 MG/DL (ref 65–99)
HCT VFR BLD AUTO: 43.7 % (ref 37–47)
HGB BLD-MCNC: 14.4 G/DL (ref 12–16)
IMM GRANULOCYTES # BLD AUTO: 0.05 K/UL (ref 0–0.11)
IMM GRANULOCYTES NFR BLD AUTO: 0.5 % (ref 0–0.9)
INR PPP: 1 (ref 0.87–1.13)
LYMPHOCYTES # BLD AUTO: 3.82 K/UL (ref 1–4.8)
LYMPHOCYTES NFR BLD: 39.8 % (ref 22–41)
MAGNESIUM SERPL-MCNC: 1.6 MG/DL (ref 1.5–2.5)
MCH RBC QN AUTO: 30 PG (ref 27–33)
MCHC RBC AUTO-ENTMCNC: 33 G/DL (ref 33.6–35)
MCV RBC AUTO: 91 FL (ref 81.4–97.8)
MONOCYTES # BLD AUTO: 0.55 K/UL (ref 0–0.85)
MONOCYTES NFR BLD AUTO: 5.7 % (ref 0–13.4)
NEUTROPHILS # BLD AUTO: 4.94 K/UL (ref 2–7.15)
NEUTROPHILS NFR BLD: 51.4 % (ref 44–72)
NRBC # BLD AUTO: 0 K/UL
NRBC BLD-RTO: 0 /100 WBC
PLATELET # BLD AUTO: 283 K/UL (ref 164–446)
PMV BLD AUTO: 9.7 FL (ref 9–12.9)
POTASSIUM SERPL-SCNC: 3.5 MMOL/L (ref 3.6–5.5)
PROT SERPL-MCNC: 7.3 G/DL (ref 6–8.2)
PROTHROMBIN TIME: 13.3 SEC (ref 12–14.6)
RBC # BLD AUTO: 4.8 M/UL (ref 4.2–5.4)
SODIUM SERPL-SCNC: 138 MMOL/L (ref 135–145)
TROPONIN I SERPL-MCNC: <0.01 NG/ML (ref 0–0.04)
WBC # BLD AUTO: 9.6 K/UL (ref 4.8–10.8)

## 2018-12-29 PROCEDURE — 85025 COMPLETE CBC W/AUTO DIFF WBC: CPT

## 2018-12-29 PROCEDURE — 99284 EMERGENCY DEPT VISIT MOD MDM: CPT

## 2018-12-29 PROCEDURE — 84484 ASSAY OF TROPONIN QUANT: CPT

## 2018-12-29 PROCEDURE — 83735 ASSAY OF MAGNESIUM: CPT

## 2018-12-29 PROCEDURE — 80053 COMPREHEN METABOLIC PANEL: CPT

## 2018-12-29 PROCEDURE — 85730 THROMBOPLASTIN TIME PARTIAL: CPT

## 2018-12-29 PROCEDURE — 85610 PROTHROMBIN TIME: CPT

## 2018-12-29 PROCEDURE — 71046 X-RAY EXAM CHEST 2 VIEWS: CPT

## 2018-12-29 PROCEDURE — 93005 ELECTROCARDIOGRAM TRACING: CPT | Performed by: EMERGENCY MEDICINE

## 2018-12-29 ASSESSMENT — PAIN SCALES - GENERAL: PAINLEVEL_OUTOF10: 0

## 2018-12-30 ENCOUNTER — APPOINTMENT (OUTPATIENT)
Dept: RADIOLOGY | Facility: MEDICAL CENTER | Age: 53
End: 2018-12-30
Attending: EMERGENCY MEDICINE
Payer: MEDICARE

## 2018-12-30 VITALS
OXYGEN SATURATION: 96 % | BODY MASS INDEX: 33.31 KG/M2 | HEIGHT: 62 IN | DIASTOLIC BLOOD PRESSURE: 75 MMHG | TEMPERATURE: 96.4 F | SYSTOLIC BLOOD PRESSURE: 138 MMHG | RESPIRATION RATE: 18 BRPM | WEIGHT: 181 LBS | HEART RATE: 79 BPM

## 2018-12-30 LAB — EKG IMPRESSION: NORMAL

## 2018-12-30 PROCEDURE — 70498 CT ANGIOGRAPHY NECK: CPT

## 2018-12-30 PROCEDURE — 700117 HCHG RX CONTRAST REV CODE 255: Performed by: EMERGENCY MEDICINE

## 2018-12-30 PROCEDURE — 70496 CT ANGIOGRAPHY HEAD: CPT

## 2018-12-30 PROCEDURE — 70551 MRI BRAIN STEM W/O DYE: CPT

## 2018-12-30 RX ORDER — METHYLPREDNISOLONE 4 MG/1
TABLET ORAL
Qty: 1 KIT | Refills: 0 | Status: SHIPPED | OUTPATIENT
Start: 2018-12-30

## 2018-12-30 RX ORDER — METHYLPREDNISOLONE 4 MG/1
TABLET ORAL
Qty: 1 KIT | Refills: 0 | Status: SHIPPED | OUTPATIENT
Start: 2018-12-30 | End: 2018-12-30

## 2018-12-30 RX ORDER — ACYCLOVIR 200 MG/1
800 CAPSULE ORAL
Qty: 200 CAP | Refills: 0 | Status: SHIPPED | OUTPATIENT
Start: 2018-12-30 | End: 2019-01-09

## 2018-12-30 RX ADMIN — IOHEXOL 80 ML: 350 INJECTION, SOLUTION INTRAVENOUS at 00:15

## 2018-12-30 NOTE — ED NOTES
All lines and monitors discontinued. Discharge instructions given, questions answered.    ambulatory out of ER, escorted by self.  Instructed not to drive after taking pain medication and pt verbalizes understanding.  Rx x medrol and aacyclovir given.

## 2018-12-30 NOTE — ED TRIAGE NOTES
"Pt presents to the ED via EMS with complaints of right sided facial droop and a \"weird feeling on the top of her head\" since early this morning. Pt Alert and oriented x4 speaking in full sentences at this time.   "

## 2018-12-30 NOTE — ED PROVIDER NOTES
"ED Provider Note    Scribed for Osman Fitzpatrick D.O. by Talib Talavera. 12/29/2018  10:52 PM    Primary care provider: Pcp Pt States None   History obtained from: Patient   History limited by: None       CHIEF COMPLAINT  Chief Complaint   Patient presents with   • Facial Droop        HPI    Perla Marie is a 53 y.o. female who presents to the ED for evaluation of right-sided facial droop onset 37 hours ago. The patient noticed right sided facial droop after she felt a spider crawling on her head. The patient confirms right leg weakness onset yesterday afternoon, nausea, cough and congestion secondary to weather, intermittent constipation, and shortness of breath upon exertion secondary to asthma. No exacerbating or alleviating factors regarding her facial droop. The patient reports that she hears many voices. She denies any fevers, emesis, diarrhea, dysuria, and urinary retention. The patient believes that she had a stroke in the past because she \"hit her head and then fell in the bath tub. The patient is post menopausal.      Patient states that she had a stroke when she lived in the [facility] because she had hit her head and fell in the bathtub but she did not seek any medical evaluation.    REVIEW OF SYSTEMS  Please see HPI for pertinent positives/negatives.  All other systems reviewed and are negative.     PAST MEDICAL HISTORY  Past Medical History:   Diagnosis Date   • Diabetes (HCC)    • Hypertension    • Psychiatric disorder     depression,schziophrenic, bi-polar        SURGICAL HISTORY  History reviewed. No pertinent surgical history.     SOCIAL HISTORY  Social History     Social History Main Topics   • Smoking status: Never Smoker   • Smokeless tobacco: Never Used   • Alcohol use No   • Drug use: No        FAMILY HISTORY  History reviewed. No pertinent family history.     CURRENT MEDICATIONS  Home Medications     Reviewed by Ozzie Guerrero R.N. (Registered Nurse) on 12/29/18 at 2220  Med List " "Status: Partial   Medication Last Dose Status   AMBIEN 5 MG PO TABS  Active   aripiprazole (ABILIFY) 30 MG tablet  Active   divalproex (DEPAKOTE) 250 MG TBEC  Active   duloxetine (CYMBALTA) 60 MG CPEP delayed-release capsule  Active   ibuprofen (MOTRIN) 400 MG Tab  Active   lisinopril (PRINIVIL) 20 MG Tab  Active   loxapine (LOXITANE) 10 MG capsule  Active   metformin (GLUCOPHAGE) 850 MG Tab  Active   risperidone (RISPERDAL) 3 MG Tab  Active                 ALLERGIES  No Known Allergies     PHYSICAL EXAM  VITAL SIGNS: BP (!) 165/100   Pulse 86   Temp (!) 35.8 °C (96.4 °F) (Tympanic)   Resp 18   Ht 1.575 m (5' 2\")   Wt 82.1 kg (181 lb)   SpO2 94%   BMI 33.11 kg/m²  @GLENYS[[id number]::@     Pulse ox interpretation: 95% I interpret this pulse ox as normal     Constitutional: Well developed, well nourished, alert in no apparent distress, nontoxic appearance    HENT: No external signs of trauma, normocephalic, EACs and TMs are clear bilaterally, oropharynx moist and clear, nose normal    Eyes: PERRL, EOMI, vision and visual fields are grossly intact bilaterally, conjunctiva without erythema, no discharge, no icterus    Neck: Soft and supple, trachea midline, no stridor, no tenderness, no LAD, no JVD, good ROM    Cardiovascular: Regular rate and rhythm, no murmurs/rubs/gallops, strong distal pulses and good perfusion    Thorax & Lungs: No respiratory distress, CTAB   Abdomen: Soft, nontender, nondistended, no guarding, no rebound, normal BS    Back: No CVAT    Extremities: No cyanosis, no edema, no gross deformity, good ROM, no tenderness, intact distal pulses with brisk cap refill    Skin: Warm, dry, no pallor/cyanosis, no rash noted    Lymphatic: No lymphadenopathy noted    Neuro: Alert and oriented to person, place, and time.  GCS 15.  CN II-XII grossly intact except for slight right-sided facial droop with forehead sparing.  Normal speech.  No pronator drift.  Equal strength bilateral UE/LE.  Sensation intact to " touch.  No cerebellar signs.  Psychiatric: Cooperative      NIH STROKE SCALE    1A: Level of Consciousness=0  Alert; keenly responsive 0  Arouses to minor stimulation +1  Requires repeated stimulation to arouse +2  Movements to pain +2  Postures or unresponsive +3    1B: Ask Month and Age=0  Both questions right 0  1 question right +1  0 questions right +2  Dysarthric/intubated/trauma/language barrier +1  Aphasic +2    1C: 'Blink Eyes' & 'Squeeze Hands'=0  (Pantomime commands if communication barrier)  Performs both tasks 0  Performs 1 task +1  Performs 0 tasks +2    2: Test Horizontal Extraocular Movements=0  Normal 0  Partial gaze palsy: can be overcome +1  Partial gaze palsy: corrects with oculocephalic reflex +1  Forced gaze palsy: cannot be overcome +2    3: Test Visual Fields=0  No visual loss 0  Partial hemianopia +1  Complete hemianopia +2  Patient is bilaterally blind +3  Bilateral hemianopia +3    4: Test Facial Palsy=1  (Use grimace if obtunded)  Normal symmetry 0  Minor paralysis (flat nasolabial fold, smile asymetry) +1  Partial paralysis (lower face) +2  Unilateral complete paralysis (upper/lower face) +3  Bilateral complete paralysis (upper/lower face) +3    5A: Test Left Arm Motor Drift=0  No drift for 10 Seconds 0  Drift, but doesn't hit bed +1  Drift, hits bed +2  Some effort against gravity +2  No effort against gravity +3  No movement +4  Amputation/joint fusion 0    5B: Test Right Arm Motor Drift=0  No drift for 10 seconds 0  Drift, but doesn't hit bed +1  Drift, hits bed +2  Some effort against gravity +2  No effort against gravity +3  No movement +4  Amputation/joint fusion 0    6A: Test Left Leg Motor Drift=0  No drift for 5 Seconds 0  Drift, but doesn't hit bed +1  Drift, hits bed +2  Some effort against gravity +2  No effort against gravity +3  No movement +4  Amputation/joint fusion 0    6B: Test Right Leg Motor Drift=0  No drift for 5 Seconds 0  Drift, but doesn't hit bed +1  Drift, hits  bed +2  Some effort against gravity +2  No effort against gravity +3  No movement +4  Amputation/joint fusion 0    7: Test Limb Ataxia =0  (FNF/Heel-Shin)  No ataxia 0  Ataxia in 1 limb +1  Ataxia in 2 limbs +2  Does not understand 0  Paralyzed 0  Amputation/joint fusion 0    8: Test Sensation=0  Normal; no sensory loss 0  Mild-moderate loss: less sharp/more dull +1  Mild-moderate loss: can sense being touched +1  Complete loss: cannot sense being touched at all +2  No response and quadriplegic +2  Coma/unresponsive +2    9: Test Language/Aphasia=0  Normal; no aphasia 0  Mild-moderate aphasia: some obvious changes, without significant limitation +1  Severe aphasia: fragmentary expression, inference needed, cannot identify materials +2  Mute/global aphasia: no usable speech/auditory comprehension +3  Coma/unresponsive +3    10: Test Dysarthria=0  Normal 0  Mild-moderate dysarthria: slurring but can be understood +1  Severe dysarthria: unintelligble slurring or out of proportion to dysphasia +2  Mute/anarthric +2  Intubated/unable to test 0    11: Test Extinction/Inattention=0  No abnormality 0  Visual/tactile/auditory/spatial/personal inattention +1  Extinction to bilateral simultaneous stimulation +1  Profound chandana-inattention (ex: does not recognize own hand) +2  Extinction to >1 modality +2      NIH Stroke Scale=1        DIAGNOSTIC STUDIES / PROCEDURES    EKG  12 Lead EKG obtained at 2309 and interpreted by me:   Rate: 87   Rhythm: Sinus rhythm   Ectopy: None  Intervals: Normal   Axis: LAD   QRS: LAFB, late precordial R wave transition  ST segments: Normal  T Waves: Normal    Clinical Impression: Sinus rhythm without acute ischemic changes or dysrhythmia       LABS  All labs reviewed by me.     Results for orders placed or performed during the hospital encounter of 12/29/18   CBC WITH DIFFERENTIAL   Result Value Ref Range    WBC 9.6 4.8 - 10.8 K/uL    RBC 4.80 4.20 - 5.40 M/uL    Hemoglobin 14.4 12.0 - 16.0 g/dL     Hematocrit 43.7 37.0 - 47.0 %    MCV 91.0 81.4 - 97.8 fL    MCH 30.0 27.0 - 33.0 pg    MCHC 33.0 (L) 33.6 - 35.0 g/dL    RDW 40.2 35.9 - 50.0 fL    Platelet Count 283 164 - 446 K/uL    MPV 9.7 9.0 - 12.9 fL    Neutrophils-Polys 51.40 44.00 - 72.00 %    Lymphocytes 39.80 22.00 - 41.00 %    Monocytes 5.70 0.00 - 13.40 %    Eosinophils 1.80 0.00 - 6.90 %    Basophils 0.80 0.00 - 1.80 %    Immature Granulocytes 0.50 0.00 - 0.90 %    Nucleated RBC 0.00 /100 WBC    Neutrophils (Absolute) 4.94 2.00 - 7.15 K/uL    Lymphs (Absolute) 3.82 1.00 - 4.80 K/uL    Monos (Absolute) 0.55 0.00 - 0.85 K/uL    Eos (Absolute) 0.17 0.00 - 0.51 K/uL    Baso (Absolute) 0.08 0.00 - 0.12 K/uL    Immature Granulocytes (abs) 0.05 0.00 - 0.11 K/uL    NRBC (Absolute) 0.00 K/uL   COMP METABOLIC PANEL   Result Value Ref Range    Sodium 138 135 - 145 mmol/L    Potassium 3.5 (L) 3.6 - 5.5 mmol/L    Chloride 103 96 - 112 mmol/L    Co2 24 20 - 33 mmol/L    Anion Gap 11.0 0.0 - 11.9    Glucose 224 (H) 65 - 99 mg/dL    Bun 22 8 - 22 mg/dL    Creatinine 0.76 0.50 - 1.40 mg/dL    Calcium 9.2 8.5 - 10.5 mg/dL    AST(SGOT) 14 12 - 45 U/L    ALT(SGPT) 20 2 - 50 U/L    Alkaline Phosphatase 56 30 - 99 U/L    Total Bilirubin 0.5 0.1 - 1.5 mg/dL    Albumin 4.1 3.2 - 4.9 g/dL    Total Protein 7.3 6.0 - 8.2 g/dL    Globulin 3.2 1.9 - 3.5 g/dL    A-G Ratio 1.3 g/dL   TROPONIN   Result Value Ref Range    Troponin I <0.01 0.00 - 0.04 ng/mL   MAGNESIUM   Result Value Ref Range    Magnesium 1.6 1.5 - 2.5 mg/dL   PROTHROMBIN TIME (INR)   Result Value Ref Range    PT 13.3 12.0 - 14.6 sec    INR 1.00 0.87 - 1.13   APTT   Result Value Ref Range    APTT 29.1 24.7 - 36.0 sec   ESTIMATED GFR   Result Value Ref Range    GFR If African American >60 >60 mL/min/1.73 m 2    GFR If Non African American >60 >60 mL/min/1.73 m 2   EKG (NOW)   Result Value Ref Range    Report       [facility] Emergency Dept.    Test Date:  2018-12-29  Pt Name:    DEAN CAMPOS                    Department: ER  MRN:        [id number]                      Room:       [facility]  Gender:     Female                       Technician: [id number]  :        1965                   Requested By:KIANNA VERA  Order #:    [id number]                    Reading MD: Kianna Vera    Measurements  Intervals                                Axis  Rate:       87                           P:          25  MD:         136                          QRS:        -59  QRSD:       80                           T:          -1  QT:         368  QTc:        443    Interpretive Statements  SINUS RHYTHM  LEFT ANTERIOR FASCICULAR BLOCK  LATE PRECORDIAL R/S TRANSITION  BORDERLINE T ABNORMALITIES, INFERIOR LEADS  Compared to ECG 2018 19:24:52  T-wave abnormality now present    Electronically Signed On 2018 3:43:39 PST by Kianna Vera          RADIOLOGY  The radiologist's interpretation of all radiological studies have been reviewed by me.     MR-BRAIN-W/O   Final Result      MRI of the brain without contrast within normal limits for age with mild atrophy and mild white matter changes.      CT-CTA NECK WITH & W/O-POST PROCESSING   Final Result         1. No evidence of flow-limiting stenosis in the cervical carotid or cervical vertebral arteries.      CT-CTA HEAD WITH & W/O-POST PROCESS   Final Result         1. No hemodynamically significant narrowing of the major intracranial vessels.      DX-CHEST-2 VIEWS   Final Result         1. No active cardiopulmonary abnormalities are identified.             COURSE & MEDICAL DECISION MAKING  Nursing notes, VS, PMSFHx reviewed in chart.     Review of past medical records shows the patient was last seen in the CD 2018 for dizziness.      Differential diagnoses considered include but are not limited to: Tension/migraine/cluster HA, intracranial hemorrhage/SAH, aneurysm, dissection, central venous thrombosis, viral syndrome, sinusitis, tumor/cancer, Bell's palsy       10:52 PM The  patient was evaluated at bedside. I informed her that she will undergo labs for evaluation.      History and physical exam as above.  Given the length of time from onset of patient's symptoms, thrombolytic was considered but patient clearly not a candidate.  We did proceed with stroke workup initially with CTA of head and neck and then MRI of brain with findings as above.  EKG without evidence of acute ischemic changes or dysrhythmia.  Labs are fairly unremarkable except for hyperglycemia without evidence of DKA.  I discussed the findings with the patient.  This is an alert well-appearing patient in no acute distress and nontoxic in appearance with likely mild right-sided Bell's palsy.  She will be prescribed Medrol Dosepak and acyclovir.  I will give her outpatient referral to neurology for follow-up.  I discussed with her worrisome signs and symptoms and return to ED precautions.  Patient also noted to have elevated blood pressure and will need outpatient follow-up for further management.  Patient verbalized understanding and agreed with plan of care with no further questions or concerns and ambulated without difficulty out of the ED.      The patient is referred to a primary physician for blood pressure management, diabetic screening, and for all other preventative health concerns.       FINAL IMPRESSION  1. Right-sided Bell's palsy Acute          DISPOSITION  Patient will be discharged home in stable condition.       FOLLOW UP  Justin Colon M.D.  [address] [place] [id number] [phone]    Call in 2 days      [facility], Emergency Dept  [address] [facility] [address] [phone]    If symptoms worsen         OUTPATIENT MEDICATIONS  Discharge Medication List as of 12/30/2018  3:01 AM      START taking these medications    Details   acyclovir (ZOVIRAX) 200 MG Cap Take 4 Caps by mouth 5 Times a Day for 10 days., Disp-200 Cap, R-0, Print Rx Paper                I,  Talib Talavera (Scribe), am scribing for, and in the presence of, Osman Fitzpatrick M.D.     Electronically signed by: Talib Talavera (Scribe), 12/29//2018     I, Osman Fitzpatrick M.D. personally performed the services described in this documentation, as scribed by Talib Talavera in my presence, and it is both accurate and complete. C      Portions of this record were made with voice recognition software and by scribes.  Despite my review, spelling/grammar/context errors may still remain.  Interpretation of this chart should be taken in this context.

## 2019-01-01 ENCOUNTER — APPOINTMENT (OUTPATIENT)
Dept: RADIOLOGY | Facility: MEDICAL CENTER | Age: 54
End: 2019-01-01
Attending: EMERGENCY MEDICINE
Payer: MEDICARE

## 2019-01-01 ENCOUNTER — HOSPITAL ENCOUNTER (EMERGENCY)
Facility: MEDICAL CENTER | Age: 54
End: 2019-01-02
Attending: EMERGENCY MEDICINE
Payer: MEDICARE

## 2019-01-01 DIAGNOSIS — N39.0 ACUTE UTI: ICD-10-CM

## 2019-01-01 DIAGNOSIS — K80.20 CALCULUS OF GALLBLADDER WITHOUT CHOLECYSTITIS WITHOUT OBSTRUCTION: ICD-10-CM

## 2019-01-01 DIAGNOSIS — R10.33 PERIUMBILICAL ABDOMINAL PAIN: ICD-10-CM

## 2019-01-01 LAB
ALBUMIN SERPL BCP-MCNC: 3.8 G/DL (ref 3.2–4.9)
ALBUMIN/GLOB SERPL: 1.4 G/DL
ALP SERPL-CCNC: 64 U/L (ref 30–99)
ALT SERPL-CCNC: 17 U/L (ref 2–50)
ANION GAP SERPL CALC-SCNC: 11 MMOL/L (ref 0–11.9)
APPEARANCE UR: ABNORMAL
AST SERPL-CCNC: 11 U/L (ref 12–45)
BACTERIA #/AREA URNS HPF: ABNORMAL /HPF
BASOPHILS # BLD AUTO: 0.6 % (ref 0–1.8)
BASOPHILS # BLD: 0.06 K/UL (ref 0–0.12)
BILIRUB SERPL-MCNC: 0.4 MG/DL (ref 0.1–1.5)
BILIRUB UR QL STRIP.AUTO: NEGATIVE
BUN SERPL-MCNC: 21 MG/DL (ref 8–22)
CALCIUM SERPL-MCNC: 9.4 MG/DL (ref 8.5–10.5)
CHLORIDE SERPL-SCNC: 104 MMOL/L (ref 96–112)
CO2 SERPL-SCNC: 25 MMOL/L (ref 20–33)
COLOR UR: YELLOW
CREAT SERPL-MCNC: 0.82 MG/DL (ref 0.5–1.4)
EOSINOPHIL # BLD AUTO: 0.28 K/UL (ref 0–0.51)
EOSINOPHIL NFR BLD: 2.9 % (ref 0–6.9)
EPI CELLS #/AREA URNS HPF: ABNORMAL /HPF
ERYTHROCYTE [DISTWIDTH] IN BLOOD BY AUTOMATED COUNT: 41.9 FL (ref 35.9–50)
GLOBULIN SER CALC-MCNC: 2.8 G/DL (ref 1.9–3.5)
GLUCOSE SERPL-MCNC: 272 MG/DL (ref 65–99)
GLUCOSE UR STRIP.AUTO-MCNC: >=1000 MG/DL
HCT VFR BLD AUTO: 40.9 % (ref 37–47)
HGB BLD-MCNC: 13.6 G/DL (ref 12–16)
HYALINE CASTS #/AREA URNS LPF: ABNORMAL /LPF
IMM GRANULOCYTES # BLD AUTO: 0.05 K/UL (ref 0–0.11)
IMM GRANULOCYTES NFR BLD AUTO: 0.5 % (ref 0–0.9)
KETONES UR STRIP.AUTO-MCNC: 15 MG/DL
LEUKOCYTE ESTERASE UR QL STRIP.AUTO: ABNORMAL
LIPASE SERPL-CCNC: 27 U/L (ref 11–82)
LYMPHOCYTES # BLD AUTO: 3.46 K/UL (ref 1–4.8)
LYMPHOCYTES NFR BLD: 35.5 % (ref 22–41)
MCH RBC QN AUTO: 30.7 PG (ref 27–33)
MCHC RBC AUTO-ENTMCNC: 33.3 G/DL (ref 33.6–35)
MCV RBC AUTO: 92.3 FL (ref 81.4–97.8)
MICRO URNS: ABNORMAL
MONOCYTES # BLD AUTO: 0.61 K/UL (ref 0–0.85)
MONOCYTES NFR BLD AUTO: 6.3 % (ref 0–13.4)
NEUTROPHILS # BLD AUTO: 5.3 K/UL (ref 2–7.15)
NEUTROPHILS NFR BLD: 54.2 % (ref 44–72)
NITRITE UR QL STRIP.AUTO: POSITIVE
NRBC # BLD AUTO: 0 K/UL
NRBC BLD-RTO: 0 /100 WBC
PH UR STRIP.AUTO: 5.5 [PH]
PLATELET # BLD AUTO: 252 K/UL (ref 164–446)
PMV BLD AUTO: 9.7 FL (ref 9–12.9)
POTASSIUM SERPL-SCNC: 3.6 MMOL/L (ref 3.6–5.5)
PROT SERPL-MCNC: 6.6 G/DL (ref 6–8.2)
PROT UR QL STRIP: NEGATIVE MG/DL
RBC # BLD AUTO: 4.43 M/UL (ref 4.2–5.4)
RBC # URNS HPF: ABNORMAL /HPF
RBC UR QL AUTO: NEGATIVE
SODIUM SERPL-SCNC: 140 MMOL/L (ref 135–145)
SP GR UR STRIP.AUTO: 1.03
TROPONIN I SERPL-MCNC: <0.01 NG/ML (ref 0–0.04)
UROBILINOGEN UR STRIP.AUTO-MCNC: 1 MG/DL
WBC # BLD AUTO: 9.8 K/UL (ref 4.8–10.8)
WBC #/AREA URNS HPF: ABNORMAL /HPF

## 2019-01-01 PROCEDURE — 80307 DRUG TEST PRSMV CHEM ANLYZR: CPT

## 2019-01-01 PROCEDURE — 87186 SC STD MICRODIL/AGAR DIL: CPT

## 2019-01-01 PROCEDURE — 96365 THER/PROPH/DIAG IV INF INIT: CPT

## 2019-01-01 PROCEDURE — 85025 COMPLETE CBC W/AUTO DIFF WBC: CPT

## 2019-01-01 PROCEDURE — 83690 ASSAY OF LIPASE: CPT

## 2019-01-01 PROCEDURE — 87077 CULTURE AEROBIC IDENTIFY: CPT

## 2019-01-01 PROCEDURE — 84484 ASSAY OF TROPONIN QUANT: CPT

## 2019-01-01 PROCEDURE — 81001 URINALYSIS AUTO W/SCOPE: CPT | Mod: XU

## 2019-01-01 PROCEDURE — 99285 EMERGENCY DEPT VISIT HI MDM: CPT

## 2019-01-01 PROCEDURE — 93005 ELECTROCARDIOGRAM TRACING: CPT | Performed by: EMERGENCY MEDICINE

## 2019-01-01 PROCEDURE — 80053 COMPREHEN METABOLIC PANEL: CPT

## 2019-01-01 PROCEDURE — 87086 URINE CULTURE/COLONY COUNT: CPT

## 2019-01-01 ASSESSMENT — PAIN SCALES - GENERAL: PAINLEVEL_OUTOF10: 8

## 2019-01-01 ASSESSMENT — LIFESTYLE VARIABLES: DO YOU DRINK ALCOHOL: NO

## 2019-01-01 NOTE — LETTER
1/4/2019               Perla Mickie Marie  [address]  [facility] [address]        Dear Perla (MR#[id number])    This letter is sent in regards to your, recent visit to the [facility] Emergency Department on 1/1/2019.  During the visit, tests were performed to assist the physician in a medical diagnosis.  A review of those tests requires that we notify you of the following:    Your urine culture was POSITIVE for a bacteria called Escherichia coli. The antibiotic prescribed for you (sulfamethoxazole-trimethoprim) should be active to treat this bacteria. IT IS IMPORTANT THAT YOU CONTINUE TAKING YOUR ANTIBIOTIC UNTIL IT IS FINISHED.      Please feel free to contact me at the number below if you have any questions or concerns. Thank you for your cooperation in the matter.    Sincerely,  ED Culture Follow-Up Staff  July Juarez, PharmD    [facility], Emergency Department  [address] [place] [id number]  [phone] (ED Culture Line)  [phone]

## 2019-01-02 ENCOUNTER — APPOINTMENT (OUTPATIENT)
Dept: RADIOLOGY | Facility: MEDICAL CENTER | Age: 54
End: 2019-01-02
Attending: EMERGENCY MEDICINE
Payer: MEDICARE

## 2019-01-02 VITALS
OXYGEN SATURATION: 92 % | DIASTOLIC BLOOD PRESSURE: 82 MMHG | HEIGHT: 62 IN | SYSTOLIC BLOOD PRESSURE: 174 MMHG | RESPIRATION RATE: 20 BRPM | HEART RATE: 84 BPM | WEIGHT: 180 LBS | BODY MASS INDEX: 33.13 KG/M2 | TEMPERATURE: 96.3 F

## 2019-01-02 LAB — EKG IMPRESSION: NORMAL

## 2019-01-02 PROCEDURE — 700105 HCHG RX REV CODE 258: Performed by: EMERGENCY MEDICINE

## 2019-01-02 PROCEDURE — 36415 COLL VENOUS BLD VENIPUNCTURE: CPT

## 2019-01-02 PROCEDURE — 700117 HCHG RX CONTRAST REV CODE 255: Performed by: EMERGENCY MEDICINE

## 2019-01-02 PROCEDURE — 700111 HCHG RX REV CODE 636 W/ 250 OVERRIDE (IP): Performed by: EMERGENCY MEDICINE

## 2019-01-02 PROCEDURE — 74177 CT ABD & PELVIS W/CONTRAST: CPT

## 2019-01-02 PROCEDURE — 76705 ECHO EXAM OF ABDOMEN: CPT

## 2019-01-02 RX ORDER — ONDANSETRON 4 MG/1
4 TABLET, ORALLY DISINTEGRATING ORAL EVERY 6 HOURS PRN
Qty: 10 TAB | Refills: 0 | Status: SHIPPED | OUTPATIENT
Start: 2019-01-02

## 2019-01-02 RX ORDER — SULFAMETHOXAZOLE AND TRIMETHOPRIM 800; 160 MG/1; MG/1
1 TABLET ORAL 2 TIMES DAILY
Qty: 14 TAB | Refills: 0 | Status: SHIPPED | OUTPATIENT
Start: 2019-01-02 | End: 2019-01-09

## 2019-01-02 RX ADMIN — CEFTRIAXONE SODIUM 1 G: 1 INJECTION, POWDER, FOR SOLUTION INTRAMUSCULAR; INTRAVENOUS at 01:36

## 2019-01-02 RX ADMIN — IOHEXOL 100 ML: 350 INJECTION, SOLUTION INTRAVENOUS at 00:13

## 2019-01-02 NOTE — ED PROVIDER NOTES
"ED Provider Note    CHIEF COMPLAINT  Chief Complaint   Patient presents with   • Abdominal Pain     Pt reports mid abdominal pain x1 day. Hx of abdominal pain        HPI    Primary care provider: Pcp Pt States None   History obtained from: Patient  History limited by: None     Perla Marie is a 53 y.o. female who presents to the ED by EMS complaining of severe pain on her belly button waking her from sleep around 6:00 tonight.  She describes the pain as \"like a needle poking\" and denies radiation or pain anywhere else.  No prior history of similar pain.  She has not taken any medication or noticed anything that has helped with her pain.  She denies fever/nausea/vomiting/diarrhea/constipation/hematochezia/melena/dysuria/hematuria.  No prior abdominal surgeries except for .  Patient was seen by me in this ED  for right-sided facial droop and states that she has not filled her medicines because she cannot afford it.  Patient reports that her facial droop is better.    REVIEW OF SYSTEMS  Please see HPI for pertinent positives/negatives.  All other systems reviewed and are negative.     PAST MEDICAL HISTORY  Past Medical History:   Diagnosis Date   • Diabetes (HCC)    • Hypertension    • Psychiatric disorder     depression,schziophrenic, bi-polar        SURGICAL HISTORY  History reviewed. No pertinent surgical history.     SOCIAL HISTORY  Social History     Social History Main Topics   • Smoking status: Never Smoker   • Smokeless tobacco: Never Used   • Alcohol use No   • Drug use: No   • Sexual activity: Not on file        FAMILY HISTORY  History reviewed. No pertinent family history.     CURRENT MEDICATIONS  Home Medications     Reviewed by Wild San R.N. (Registered Nurse) on 19 at 2205  Med List Status: Partial   Medication Last Dose Status   acyclovir (ZOVIRAX) 200 MG Cap  Active   AMBIEN 5 MG PO TABS  Active   aripiprazole (ABILIFY) 30 MG tablet  Active   divalproex " "(DEPAKOTE) 250 MG TBEC  Active   duloxetine (CYMBALTA) 60 MG CPEP delayed-release capsule  Active   ibuprofen (MOTRIN) 400 MG Tab  Active   lisinopril (PRINIVIL) 20 MG Tab  Active   loxapine (LOXITANE) 10 MG capsule  Active   metformin (GLUCOPHAGE) 850 MG Tab  Active   MethylPREDNISolone (MEDROL DOSEPAK) 4 MG Tablet Therapy Pack  Active   risperidone (RISPERDAL) 3 MG Tab  Active                 ALLERGIES  No Known Allergies     PHYSICAL EXAM  VITAL SIGNS: BP (!) 174/82   Pulse 84   Temp (!) 35.7 °C (96.3 °F) (Temporal)   Resp 20   Ht 1.575 m (5' 2\")   Wt 81.6 kg (180 lb)   SpO2 92%   BMI 32.92 kg/m²  @GLENYS[[id number]::@     Pulse ox interpretation: 94% I interpret this pulse ox as normal       Constitutional: Well developed, well nourished, alert in no apparent distress, nontoxic appearance    HENT: No external signs of trauma, normocephalic, oropharynx moist and clear, nose normal    Eyes: PERRL, conjunctiva without erythema, no discharge, no icterus    Neck: Soft and supple, trachea midline, no stridor, no tenderness, no LAD, no JVD, good ROM    Cardiovascular: Regular rate and rhythm, no murmurs/rubs/gallops, strong distal pulses and good perfusion    Thorax & Lungs: No respiratory distress, CTAB   Abdomen: Soft, mild periumbilical tenderness to palpation, nondistended, no guarding, no rebound, normal BS    Back: No CVAT    Extremities: No cyanosis, no edema, no gross deformity, good ROM, no tenderness, intact distal pulses with brisk cap refill    Skin: Warm, dry, no pallor/cyanosis, no rash noted    Lymphatic: No lymphadenopathy noted    Neuro: Alert and oriented to person, place, and time.  GCS 15.  CN II-XII grossly intact.  Normal speech.  No pronator drift.  Equal strength bilateral UE/LE.  Sensation intact to touch.  No cerebellar signs.  Facial droop is no longer present.  Psychiatric: Cooperative        DIAGNOSTIC STUDIES / PROCEDURES    EKG  12 Lead EKG obtained at 2242 and interpreted by me:   Rate: " 91   Rhythm: Sinus rhythm   Ectopy: None  Intervals: Normal   Axis: LAD  QRS: LAFB, late precordial R wave transition  ST segments: Normal  T Waves: Normal    Clinical Impression: Sinus rhythm without acute ischemic changes or dysrhythmia  Compared to December 29, 2018 with similar appearance      LABS  All labs reviewed by me.     Results for orders placed or performed during the hospital encounter of 01/01/19   CBC WITH DIFFERENTIAL   Result Value Ref Range    WBC 9.8 4.8 - 10.8 K/uL    RBC 4.43 4.20 - 5.40 M/uL    Hemoglobin 13.6 12.0 - 16.0 g/dL    Hematocrit 40.9 37.0 - 47.0 %    MCV 92.3 81.4 - 97.8 fL    MCH 30.7 27.0 - 33.0 pg    MCHC 33.3 (L) 33.6 - 35.0 g/dL    RDW 41.9 35.9 - 50.0 fL    Platelet Count 252 164 - 446 K/uL    MPV 9.7 9.0 - 12.9 fL    Neutrophils-Polys 54.20 44.00 - 72.00 %    Lymphocytes 35.50 22.00 - 41.00 %    Monocytes 6.30 0.00 - 13.40 %    Eosinophils 2.90 0.00 - 6.90 %    Basophils 0.60 0.00 - 1.80 %    Immature Granulocytes 0.50 0.00 - 0.90 %    Nucleated RBC 0.00 /100 WBC    Neutrophils (Absolute) 5.30 2.00 - 7.15 K/uL    Lymphs (Absolute) 3.46 1.00 - 4.80 K/uL    Monos (Absolute) 0.61 0.00 - 0.85 K/uL    Eos (Absolute) 0.28 0.00 - 0.51 K/uL    Baso (Absolute) 0.06 0.00 - 0.12 K/uL    Immature Granulocytes (abs) 0.05 0.00 - 0.11 K/uL    NRBC (Absolute) 0.00 K/uL   COMP METABOLIC PANEL   Result Value Ref Range    Sodium 140 135 - 145 mmol/L    Potassium 3.6 3.6 - 5.5 mmol/L    Chloride 104 96 - 112 mmol/L    Co2 25 20 - 33 mmol/L    Anion Gap 11.0 0.0 - 11.9    Glucose 272 (H) 65 - 99 mg/dL    Bun 21 8 - 22 mg/dL    Creatinine 0.82 0.50 - 1.40 mg/dL    Calcium 9.4 8.5 - 10.5 mg/dL    AST(SGOT) 11 (L) 12 - 45 U/L    ALT(SGPT) 17 2 - 50 U/L    Alkaline Phosphatase 64 30 - 99 U/L    Total Bilirubin 0.4 0.1 - 1.5 mg/dL    Albumin 3.8 3.2 - 4.9 g/dL    Total Protein 6.6 6.0 - 8.2 g/dL    Globulin 2.8 1.9 - 3.5 g/dL    A-G Ratio 1.4 g/dL   LIPASE   Result Value Ref Range    Lipase 27 11 -  82 U/L   TROPONIN   Result Value Ref Range    Troponin I <0.01 0.00 - 0.04 ng/mL   URINALYSIS CULTURE, IF INDICATED   Result Value Ref Range    Color Yellow     Character Cloudy (A)     Specific Gravity 1.031 <1.035    Ph 5.5 5.0 - 8.0    Glucose >=1000 (A) Negative mg/dL    Ketones 15 (A) Negative mg/dL    Protein Negative Negative mg/dL    Bilirubin Negative Negative    Urobilinogen, Urine 1.0 Negative    Nitrite Positive (A) Negative    Leukocyte Esterase Small (A) Negative    Occult Blood Negative Negative    Micro Urine Req Microscopic    URINE DRUG SCREEN   Result Value Ref Range    Amphetamines Urine Negative Negative    Barbiturates Negative Negative    Benzodiazepines Negative Negative    Cocaine Metabolite Negative Negative    Methadone Negative Negative    Opiates Negative Negative    Oxycodone Negative Negative    Phencyclidine -Pcp Negative Negative    Cannabinoid Metab Negative Negative   ESTIMATED GFR   Result Value Ref Range    GFR If African American >60 >60 mL/min/1.73 m 2    GFR If Non African American >60 >60 mL/min/1.73 m 2   URINE MICROSCOPIC (W/UA)   Result Value Ref Range    WBC 20-50 (A) /hpf    RBC 2-5 (A) /hpf    Bacteria Many (A) None /hpf    Epithelial Cells Few /hpf    Hyaline Cast 0-2 /lpf   EKG (NOW)   Result Value Ref Range    Report       [facility] Emergency Dept.    Test Date:  2019  Pt Name:    DEAN CAMPOS                   Department: ER  MRN:        [id number]                      Room:       [facility]  Gender:     Female                       Technician: [id number]  :        1965                   Requested By:KIANNA VERA  Order #:    [id number]                    Reading MD:    Measurements  Intervals                                Axis  Rate:       91                           P:          23  IN:         140                          QRS:        -66  QRSD:       84                           T:          6  QT:         356  QTc:        439    Interpretive  Statements  SINUS RHYTHM  LEFT ANTERIOR FASCICULAR BLOCK  BORDERLINE LOW VOLTAGE IN FRONTAL LEADS  CONSIDER ANTERIOR INFARCT  Compared to ECG 12/29/2018 23:09:42  Myocardial infarct finding now present  T-wave abnormality no longer present          RADIOLOGY  The radiologist's interpretation of all radiological studies have been reviewed by me.     US-RUQ   Final Result      1.  Increased hepatic echotexture is consistent with fatty infiltration.      2.  Cholelithiasis without sonographic evidence of cholecystitis      CT-ABDOMEN-PELVIS WITH   Final Result      1.  No acute intra-abdominal findings.      2.  Cholelithiasis.      3.  Bilateral renal scarring.      4.  Atherosclerosis.             COURSE & MEDICAL DECISION MAKING  Nursing notes, VS, PMSFHx reviewed in chart.     Review of past medical records shows the patient was seen by me on December 29, 2018 for right facial droop.  Workup was unremarkable including CTA of head and neck and MRI of the brain.  She was prescribed Medrol Dosepak and acyclovir for possible Bell's palsy.      Differential diagnoses considered include but are not limited to: Appendicitis, AMI, AAA, bowel perforation/obstruction, ileus, cholecystitis/ascending cholangitis, gallstone/biliary colic, diverticulitis, mesenteric ischemia, pancreatitis, PUD, gastritis, KS/renal colic, UTI/pyelo, colitis, constipation, muscle strain, hernia      History and physical exam as above.  EKG without evidence of acute ischemic changes or dysrhythmia and with similar appearance compared to 2 days ago.  Labs are fairly unremarkable with the exception of possible UTI and hyperglycemia without evidence of DKA.  Ultrasound and subsequent CT abdomen/pelvis with findings as above.  Patient was given a dose of Rocephin in the ED.  She tolerated oral fluids without any difficulty.  I discussed the findings with the patient.  She is noted to be resting comfortably in no acute distress and nontoxic in  appearance.  No signs of acute abdomen on recheck to suggest an acute surgical process.  Low clinical suspicion at this time for more serious pathology given the history/exam/findings.  She will be discharged with prescriptions of Zofran and Bactrim for her UTI.  I discussed with her worrisome signs and symptoms and return to ED precautions and she was advised on importance of outpatient follow-up.  She will also be given outpatient referral to the on-call general surgeon regarding her cholelithiasis.  Patient verbalized understanding and agreed with plan of care with no further questions or concerns.      The patient is referred to a primary physician for blood pressure management, diabetic screening, and for all other preventative health concerns.       FINAL IMPRESSION  1. Periumbilical abdominal pain Acute   2. Acute UTI Active   3. Calculus of gallbladder without cholecystitis without obstruction Active          DISPOSITION  Patient will be discharged home in stable condition.       FOLLOW UP  [address] [address]  [phone]  Call today      Brandin Yanez M.D.  [address] [address]  [phone]    Call today      [facility], Emergency Dept  [address] [facility] [id number] [phone]    If symptoms worsen         OUTPATIENT MEDICATIONS  Discharge Medication List as of 1/2/2019  2:41 AM      START taking these medications    Details   ondansetron (ZOFRAN ODT) 4 MG TABLET DISPERSIBLE Take 1 Tab by mouth every 6 hours as needed., Disp-10 Tab, R-0, Print Rx Paper      sulfamethoxazole-trimethoprim (BACTRIM DS) 800-160 MG tablet Take 1 Tab by mouth 2 times a day for 7 days., Disp-14 Tab, R-0, Print Rx Paper                Electronically signed by: Osman Fitzpatrick, 1/1/2019 10:37 PM      Portions of this record were made with voice recognition software.  Despite my review, spelling/grammar/context errors may still remain.   Interpretation of this chart should be taken in this context.

## 2019-01-02 NOTE — ED TRIAGE NOTES
"Perla Corado Frank  53 y.o. female  Chief Complaint   Patient presents with   • Abdominal Pain     Pt reports mid abdominal pain x1 day. Hx of abdominal pain       Pt BIB EMS for above CC.   Pt denies N/V/D/constipation  Pt is alert and oriented, speaking in full sentences, follows commands and responds appropriately to questions. NAD. Resp are even and unlabored.     Blood Pressure: (!) 174/82, Pulse: 100, Respiration: 16, Temperature: 36.4 °C (97.6 °F), Height: 157.5 cm (5' 2\"), Weight: 81.6 kg (180 lb), BMI (Calculated): 32.92, BSA (Calculated): 1.9, O2 (LPM): 0, O2 Delivery: None (Room Air)     "

## 2019-01-02 NOTE — ED NOTES
Perla Marie discharged via ambulatory with self.  Discharge instructions given and reviewed, patient educated to follow up with PCP and general surgeon, verbalized understanding.  Prescriptions given x 2.  All personal belongings in possession.  No questions at this time.

## 2019-01-03 LAB
AMPHET UR QL SCN: NEGATIVE
BARBITURATES UR QL SCN: NEGATIVE
BENZODIAZ UR QL SCN: NEGATIVE
BZE UR QL SCN: NEGATIVE
CANNABINOIDS UR QL SCN: NEGATIVE
METHADONE UR QL SCN: NEGATIVE
OPIATES UR QL SCN: NEGATIVE
OXYCODONE UR QL SCN: NEGATIVE
PCP UR QL SCN: NEGATIVE
PROPOXYPH UR QL SCN: NORMAL

## 2019-01-04 LAB
BACTERIA UR CULT: ABNORMAL
BACTERIA UR CULT: ABNORMAL
SIGNIFICANT IND 70042: ABNORMAL
SITE SITE: ABNORMAL
SOURCE SOURCE: ABNORMAL
TEST NAME 95000: NORMAL

## 2019-01-05 NOTE — ED NOTES
ED Positive Culture Follow-up/Notification Note:    Date: 1/4/19     Patient seen in the ED on 1/1/2019 for severe pain on her belly.   1. Periumbilical abdominal pain Acute   2. Acute UTI Active   3. Calculus of gallbladder without cholecystitis without obstruction Active      Discharge Medication List as of 1/2/2019  2:41 AM      START taking these medications    Details   ondansetron (ZOFRAN ODT) 4 MG TABLET DISPERSIBLE Take 1 Tab by mouth every 6 hours as needed., Disp-10 Tab, R-0, Print Rx Paper      sulfamethoxazole-trimethoprim (BACTRIM DS) 800-160 MG tablet Take 1 Tab by mouth 2 times a day for 7 days., Disp-14 Tab, R-0, Print Rx Paper             Allergies: Patient has no known allergies.     Vitals:    01/02/19 0100 01/02/19 0130 01/02/19 0200 01/02/19 0230   BP:       Pulse: 84 79 79 84   Resp: 20 18 20 20   Temp:    (!) 35.7 °C (96.3 °F)   TempSrc:    Temporal   SpO2: 92% 95% 93% 92%   Weight:       Height:           Final cultures:   Results     Procedure Component Value Units Date/Time    URINE CULTURE(NEW) [[id number]]  (Abnormal)  (Susceptibility) Collected:  01/01/19 2340    Order Status:  Completed Specimen:  Urine Updated:  01/04/19 0923     Significant Indicator POS (POS)     Source UR     Site -     Urine Culture - (A)      Escherichia coli  >100,000 cfu/mL   (A)    Culture & Susceptibility     ESCHERICHIA COLI     Antibiotic Sensitivity Microscan Unit Status    Ampicillin Sensitive <=8 mcg/mL Final    Method: SENSITIVITY, SYLVIE    Cefepime Sensitive <=8 mcg/mL Final    Method: SENSITIVITY, SYLVIE    Cefotaxime Sensitive <=2 mcg/mL Final    Method: SENSITIVITY, SYLVIE    Cefotetan Sensitive <=16 mcg/mL Final    Method: SENSITIVITY, SYLVIE    Ceftazidime Sensitive <=1 mcg/mL Final    Method: SENSITIVITY, SYLVIE    Ceftriaxone Sensitive <=8 mcg/mL Final    Method: SENSITIVITY, SYLVIE    Cefuroxime Sensitive <=4 mcg/mL Final    Method: SENSITIVITY, SYLVIE    Cephalothin Sensitive <=8 mcg/mL Final    Method:  SENSITIVITY, SYLVIE    Ciprofloxacin Sensitive <=1 mcg/mL Final    Method: SENSITIVITY, SYLVIE    Gentamicin Sensitive <=4 mcg/mL Final    Method: SENSITIVITY, SYLVIE    Levofloxacin Sensitive <=2 mcg/mL Final    Method: SENSITIVITY, SYLVIE    Nitrofurantoin Sensitive <=32 mcg/mL Final    Method: SENSITIVITY, SYLVIE    Pip/Tazobactam Sensitive <=16 mcg/mL Final    Method: SENSITIVITY, SYLVIE    Piperacillin Sensitive <=16 mcg/mL Final    Method: SENSITIVITY, SYLVIE    Tigecycline Sensitive <=2 mcg/mL Final    Method: SENSITIVITY, SYLVIE    Tobramycin Sensitive <=4 mcg/mL Final    Method: SENSITIVITY, SYLVIE    Trimeth/Sulfa Sensitive <=2/38 mcg/mL Final    Method: SENSITIVITY, SYLVIE                       URINALYSIS CULTURE, IF INDICATED [[id number]]  (Abnormal) Collected:  01/01/19 5080    Order Status:  Completed Specimen:  Urine from Urine, Clean Catch Updated:  01/01/19 0411     Color Yellow     Character Cloudy (A)     Specific Gravity 1.031     Ph 5.5     Glucose >=1000 (A) mg/dL      Ketones 15 (A) mg/dL      Protein Negative mg/dL      Bilirubin Negative     Urobilinogen, Urine 1.0     Nitrite Positive (A)     Leukocyte Esterase Small (A)     Occult Blood Negative     Micro Urine Req Microscopic          Plan:   Appropriate antibiotic therapy prescribed. No changes required based upon culture result.  Sent letter to patient to notify of positive culture result and encourage compliance with prescribed antibiotics.     July Juarez

## 2019-01-21 ENCOUNTER — APPOINTMENT (OUTPATIENT)
Dept: RADIOLOGY | Facility: MEDICAL CENTER | Age: 54
End: 2019-01-21
Attending: EMERGENCY MEDICINE
Payer: MEDICARE

## 2019-01-21 ENCOUNTER — HOSPITAL ENCOUNTER (EMERGENCY)
Facility: MEDICAL CENTER | Age: 54
End: 2019-01-22
Attending: EMERGENCY MEDICINE
Payer: MEDICARE

## 2019-01-21 DIAGNOSIS — K59.00 CONSTIPATION, UNSPECIFIED CONSTIPATION TYPE: ICD-10-CM

## 2019-01-21 DIAGNOSIS — R10.33 PERIUMBILICAL ABDOMINAL PAIN: ICD-10-CM

## 2019-01-21 DIAGNOSIS — N39.0 ACUTE UTI: ICD-10-CM

## 2019-01-21 LAB
ALBUMIN SERPL BCP-MCNC: 4.1 G/DL (ref 3.2–4.9)
ALBUMIN/GLOB SERPL: 1.4 G/DL
ALP SERPL-CCNC: 56 U/L (ref 30–99)
ALT SERPL-CCNC: 16 U/L (ref 2–50)
ANION GAP SERPL CALC-SCNC: 11 MMOL/L (ref 0–11.9)
AST SERPL-CCNC: 15 U/L (ref 12–45)
BASOPHILS # BLD AUTO: 0.9 % (ref 0–1.8)
BASOPHILS # BLD: 0.07 K/UL (ref 0–0.12)
BILIRUB SERPL-MCNC: 0.6 MG/DL (ref 0.1–1.5)
BUN SERPL-MCNC: 12 MG/DL (ref 8–22)
CALCIUM SERPL-MCNC: 9.5 MG/DL (ref 8.5–10.5)
CHLORIDE SERPL-SCNC: 104 MMOL/L (ref 96–112)
CO2 SERPL-SCNC: 23 MMOL/L (ref 20–33)
CREAT SERPL-MCNC: 0.71 MG/DL (ref 0.5–1.4)
EOSINOPHIL # BLD AUTO: 0.1 K/UL (ref 0–0.51)
EOSINOPHIL NFR BLD: 1.3 % (ref 0–6.9)
ERYTHROCYTE [DISTWIDTH] IN BLOOD BY AUTOMATED COUNT: 43.3 FL (ref 35.9–50)
GLOBULIN SER CALC-MCNC: 3 G/DL (ref 1.9–3.5)
GLUCOSE SERPL-MCNC: 246 MG/DL (ref 65–99)
HCT VFR BLD AUTO: 44 % (ref 37–47)
HGB BLD-MCNC: 14 G/DL (ref 12–16)
IMM GRANULOCYTES # BLD AUTO: 0.04 K/UL (ref 0–0.11)
IMM GRANULOCYTES NFR BLD AUTO: 0.5 % (ref 0–0.9)
LIPASE SERPL-CCNC: 26 U/L (ref 11–82)
LYMPHOCYTES # BLD AUTO: 3.46 K/UL (ref 1–4.8)
LYMPHOCYTES NFR BLD: 43.3 % (ref 22–41)
MCH RBC QN AUTO: 29.9 PG (ref 27–33)
MCHC RBC AUTO-ENTMCNC: 31.8 G/DL (ref 33.6–35)
MCV RBC AUTO: 94 FL (ref 81.4–97.8)
MONOCYTES # BLD AUTO: 0.48 K/UL (ref 0–0.85)
MONOCYTES NFR BLD AUTO: 6 % (ref 0–13.4)
NEUTROPHILS # BLD AUTO: 3.85 K/UL (ref 2–7.15)
NEUTROPHILS NFR BLD: 48 % (ref 44–72)
NRBC # BLD AUTO: 0 K/UL
NRBC BLD-RTO: 0 /100 WBC
PLATELET # BLD AUTO: 244 K/UL (ref 164–446)
PMV BLD AUTO: 9.5 FL (ref 9–12.9)
POTASSIUM SERPL-SCNC: 3.4 MMOL/L (ref 3.6–5.5)
PROT SERPL-MCNC: 7.1 G/DL (ref 6–8.2)
RBC # BLD AUTO: 4.68 M/UL (ref 4.2–5.4)
SODIUM SERPL-SCNC: 138 MMOL/L (ref 135–145)
WBC # BLD AUTO: 8 K/UL (ref 4.8–10.8)

## 2019-01-21 PROCEDURE — 87086 URINE CULTURE/COLONY COUNT: CPT

## 2019-01-21 PROCEDURE — 80053 COMPREHEN METABOLIC PANEL: CPT

## 2019-01-21 PROCEDURE — 96375 TX/PRO/DX INJ NEW DRUG ADDON: CPT

## 2019-01-21 PROCEDURE — 87186 SC STD MICRODIL/AGAR DIL: CPT

## 2019-01-21 PROCEDURE — 700111 HCHG RX REV CODE 636 W/ 250 OVERRIDE (IP): Performed by: EMERGENCY MEDICINE

## 2019-01-21 PROCEDURE — 87077 CULTURE AEROBIC IDENTIFY: CPT

## 2019-01-21 PROCEDURE — 81001 URINALYSIS AUTO W/SCOPE: CPT

## 2019-01-21 PROCEDURE — 99285 EMERGENCY DEPT VISIT HI MDM: CPT

## 2019-01-21 PROCEDURE — 85025 COMPLETE CBC W/AUTO DIFF WBC: CPT

## 2019-01-21 PROCEDURE — 83690 ASSAY OF LIPASE: CPT

## 2019-01-21 PROCEDURE — 74022 RADEX COMPL AQT ABD SERIES: CPT

## 2019-01-21 RX ORDER — KETOROLAC TROMETHAMINE 30 MG/ML
30 INJECTION, SOLUTION INTRAMUSCULAR; INTRAVENOUS ONCE
Status: COMPLETED | OUTPATIENT
Start: 2019-01-21 | End: 2019-01-21

## 2019-01-21 RX ADMIN — KETOROLAC TROMETHAMINE 30 MG: 30 INJECTION, SOLUTION INTRAMUSCULAR at 21:53

## 2019-01-21 ASSESSMENT — PAIN SCALES - GENERAL: PAINLEVEL_OUTOF10: 9

## 2019-01-21 NOTE — LETTER
1/24/2019               Perla Corado Frank  [address]  [facility] [address]        Dear Perla (MR#[id number])    This letter is sent in regards to your, recent visit to the [facility] Emergency Department on 1/21/2019.  During the visit, tests were performed to assist the physician in a medical diagnosis.  A review of those tests requires that we notify you of the following:    Your urine culture was POSITIVE for a bacteria called Escherichia coli. The antibiotic prescribed for you (sulfamethoxazole-trimethoprim) should be active to treat this bacteria. IT IS IMPORTANT THAT YOU CONTINUE TAKING YOUR ANTIBIOTIC UNTIL IT IS FINISHED.      Please feel free to contact me at the number below if you have any questions or concerns. Thank you for your cooperation in the matter.    Sincerely,  ED Culture Follow-Up Staff  Rocio Milian, PharmD    [facility], Emergency Department  [address] [place] [address]  [phone]

## 2019-01-22 VITALS
TEMPERATURE: 97.3 F | OXYGEN SATURATION: 96 % | RESPIRATION RATE: 18 BRPM | SYSTOLIC BLOOD PRESSURE: 181 MMHG | DIASTOLIC BLOOD PRESSURE: 102 MMHG | BODY MASS INDEX: 34.57 KG/M2 | WEIGHT: 189 LBS | HEART RATE: 69 BPM

## 2019-01-22 LAB
APPEARANCE UR: CLEAR
BACTERIA #/AREA URNS HPF: ABNORMAL /HPF
BILIRUB UR QL STRIP.AUTO: NEGATIVE
COLOR UR: YELLOW
EPI CELLS #/AREA URNS HPF: ABNORMAL /HPF
GLUCOSE UR STRIP.AUTO-MCNC: >=1000 MG/DL
HYALINE CASTS #/AREA URNS LPF: ABNORMAL /LPF
KETONES UR STRIP.AUTO-MCNC: NEGATIVE MG/DL
LEUKOCYTE ESTERASE UR QL STRIP.AUTO: ABNORMAL
MICRO URNS: ABNORMAL
NITRITE UR QL STRIP.AUTO: POSITIVE
PH UR STRIP.AUTO: 7 [PH]
PROT UR QL STRIP: NEGATIVE MG/DL
RBC # URNS HPF: ABNORMAL /HPF
RBC UR QL AUTO: NEGATIVE
SP GR UR STRIP.AUTO: 1.02
UROBILINOGEN UR STRIP.AUTO-MCNC: 0.2 MG/DL
WBC #/AREA URNS HPF: ABNORMAL /HPF

## 2019-01-22 PROCEDURE — 96365 THER/PROPH/DIAG IV INF INIT: CPT

## 2019-01-22 PROCEDURE — 700111 HCHG RX REV CODE 636 W/ 250 OVERRIDE (IP): Performed by: EMERGENCY MEDICINE

## 2019-01-22 PROCEDURE — 700105 HCHG RX REV CODE 258: Performed by: EMERGENCY MEDICINE

## 2019-01-22 RX ORDER — POLYETHYLENE GLYCOL 3350 17 G/17G
17 POWDER, FOR SOLUTION ORAL DAILY
Qty: 1 BOTTLE | Refills: 0 | Status: SHIPPED | OUTPATIENT
Start: 2019-01-22

## 2019-01-22 RX ORDER — SULFAMETHOXAZOLE AND TRIMETHOPRIM 800; 160 MG/1; MG/1
1 TABLET ORAL EVERY 12 HOURS
Qty: 6 TAB | Refills: 0 | Status: SHIPPED | OUTPATIENT
Start: 2019-01-22 | End: 2019-01-25

## 2019-01-22 RX ADMIN — CEFTRIAXONE SODIUM 2 G: 2 INJECTION, POWDER, FOR SOLUTION INTRAMUSCULAR; INTRAVENOUS at 00:31

## 2019-01-22 NOTE — DISCHARGE INSTRUCTIONS
Return if you have increasing pain, pain moves to the right lower quadrant, or fever.     Please follow up with a primary physician for blood pressure management, diabetic screening, and all other preventive health concerns.

## 2019-01-22 NOTE — ED PROVIDER NOTES
"ED Provider Note    Scribed for Bjorn Lopez M.D. by Kinza Armstrong. 2019, 8:47 PM.    Primary care provider: None  Means of arrival: lisa  History obtained from: patient  History limited by: none    CHIEF COMPLAINT  Chief Complaint   Patient presents with   • Abdominal Swelling     reports that she has been having some ABD pain and swelling. feels like previous issues with Hernia.       HPI  Perla Marie is a 53 y.o. female who presents to the Emergency Department for periumbilical abdominal swelling onset four hours ago. Associated pain described as a \"shock\". This does not radiate and is 9/10 in severity. She seems to have had similar symptoms before and was last seen for this in Urgent Care two months ago. This was related to a hernia. She was given an injection at the time, which helped her discomfort. Also reports inability to pass gas. Last bowel movement on Monday, but explains that she is usually irregular. Denies fever, vomiting, and ndiarrhea. No history of obstruction. Has had , but no other abdominal surgeries.    REVIEW OF SYSTEMS  Pertinent positives include abdominal swelling, abdominal pain, inability to pass gas, constipation.   Pertinent negatives include fever, vomiting, diarrhea.   All other systems negative.    PAST MEDICAL HISTORY   has a past medical history of Diabetes (HCC); Hypertension; and Psychiatric disorder.    SURGICAL HISTORY      SOCIAL HISTORY  Social History   Substance Use Topics   • Smoking status: Never Smoker   • Smokeless tobacco: Never Used   • Alcohol use No      History   Drug Use No       FAMILY HISTORY  History reviewed. No pertinent family history.    CURRENT MEDICATIONS  Home Medications     Reviewed by Ricki Wolff R.N. (Registered Nurse) on 19 at 1936  Med List Status: <None>   Medication Last Dose Status   AMBIEN 5 MG PO TABS  Active   aripiprazole (ABILIFY) 30 MG tablet  Active   divalproex (DEPAKOTE) 250 " MG TBEC  Active   duloxetine (CYMBALTA) 60 MG CPEP delayed-release capsule  Active   ibuprofen (MOTRIN) 400 MG Tab  Active   lisinopril (PRINIVIL) 20 MG Tab  Active   loxapine (LOXITANE) 10 MG capsule  Active   metformin (GLUCOPHAGE) 850 MG Tab  Active   MethylPREDNISolone (MEDROL DOSEPAK) 4 MG Tablet Therapy Pack  Active   ondansetron (ZOFRAN ODT) 4 MG TABLET DISPERSIBLE  Active   risperidone (RISPERDAL) 3 MG Tab  Active                ALLERGIES  No Known Allergies    PHYSICAL EXAM  VITAL SIGNS: BP (!) 181/102   Pulse 88   Temp 36.3 °C (97.3 °F) (Temporal)   Resp 18   Wt 85.7 kg (189 lb)   BMI 34.57 kg/m²     Constitutional: Well developed, Well nourished, mild distress.   HENT: Normocephalic, Atraumatic, Oropharynx moist.   Eyes: Conjunctiva normal, No discharge. No scleral icterus  Neck: Supple, No stridor   Cardiovascular: Normal heart rate, Normal rhythm, No murmurs, equal pulses.   Pulmonary: Normal breath sounds, No respiratory distress, No wheezing, No rales, No rhonchi.  Chest: No chest wall tenderness or deformity.   Abdomen: diffuse tenderness, diminished bowel sounds, Soft, No masses, no rebound, no guarding.   Back: No CVA tenderness.   Musculoskeletal: No major deformities noted, No tenderness.   Skin: Warm, Dry, No erythema, No rash.   Neurologic: Alert & oriented x 3, Normal motor function,  No focal deficits noted.   Psychiatric: Affect normal, Judgment normal, Mood normal.     DIAGNOSTIC STUDIES/PROCEDURES  LABS  Results for orders placed or performed during the hospital encounter of 01/21/19   CBC WITH DIFFERENTIAL   Result Value Ref Range    WBC 8.0 4.8 - 10.8 K/uL    RBC 4.68 4.20 - 5.40 M/uL    Hemoglobin 14.0 12.0 - 16.0 g/dL    Hematocrit 44.0 37.0 - 47.0 %    MCV 94.0 81.4 - 97.8 fL    MCH 29.9 27.0 - 33.0 pg    MCHC 31.8 (L) 33.6 - 35.0 g/dL    RDW 43.3 35.9 - 50.0 fL    Platelet Count 244 164 - 446 K/uL    MPV 9.5 9.0 - 12.9 fL    Neutrophils-Polys 48.00 44.00 - 72.00 %    Lymphocytes  43.30 (H) 22.00 - 41.00 %    Monocytes 6.00 0.00 - 13.40 %    Eosinophils 1.30 0.00 - 6.90 %    Basophils 0.90 0.00 - 1.80 %    Immature Granulocytes 0.50 0.00 - 0.90 %    Nucleated RBC 0.00 /100 WBC    Neutrophils (Absolute) 3.85 2.00 - 7.15 K/uL    Lymphs (Absolute) 3.46 1.00 - 4.80 K/uL    Monos (Absolute) 0.48 0.00 - 0.85 K/uL    Eos (Absolute) 0.10 0.00 - 0.51 K/uL    Baso (Absolute) 0.07 0.00 - 0.12 K/uL    Immature Granulocytes (abs) 0.04 0.00 - 0.11 K/uL    NRBC (Absolute) 0.00 K/uL   COMP METABOLIC PANEL   Result Value Ref Range    Sodium 138 135 - 145 mmol/L    Potassium 3.4 (L) 3.6 - 5.5 mmol/L    Chloride 104 96 - 112 mmol/L    Co2 23 20 - 33 mmol/L    Anion Gap 11.0 0.0 - 11.9    Glucose 246 (H) 65 - 99 mg/dL    Bun 12 8 - 22 mg/dL    Creatinine 0.71 0.50 - 1.40 mg/dL    Calcium 9.5 8.5 - 10.5 mg/dL    AST(SGOT) 15 12 - 45 U/L    ALT(SGPT) 16 2 - 50 U/L    Alkaline Phosphatase 56 30 - 99 U/L    Total Bilirubin 0.6 0.1 - 1.5 mg/dL    Albumin 4.1 3.2 - 4.9 g/dL    Total Protein 7.1 6.0 - 8.2 g/dL    Globulin 3.0 1.9 - 3.5 g/dL    A-G Ratio 1.4 g/dL   URINALYSIS CULTURE, IF INDICATED   Result Value Ref Range    Color Yellow     Character Clear     Specific Gravity 1.018 <1.035    Ph 7.0 5.0 - 8.0    Glucose >=1000 (A) Negative mg/dL    Ketones Negative Negative mg/dL    Protein Negative Negative mg/dL    Bilirubin Negative Negative    Urobilinogen, Urine 0.2 Negative    Nitrite Positive (A) Negative    Leukocyte Esterase Small (A) Negative    Occult Blood Negative Negative    Micro Urine Req Microscopic    LIPASE   Result Value Ref Range    Lipase 26 11 - 82 U/L   ESTIMATED GFR   Result Value Ref Range    GFR If African American >60 >60 mL/min/1.73 m 2    GFR If Non African American >60 >60 mL/min/1.73 m 2   URINE MICROSCOPIC (W/UA)   Result Value Ref Range    WBC 10-20 (A) /hpf    RBC 0-2 /hpf    Bacteria Many (A) None /hpf    Epithelial Cells Few /hpf    Hyaline Cast 0-2 /lpf   All labs reviewed by  me.    RADIOLOGY  DX-ABDOMEN COMPLETE WITH AP OR PA CXR   Final Result      1.  No pneumonia or pneumothorax.   2.  No evidence of bowel obstruction or pneumoperitoneum.   3.  Probable gallstones.   4.  Increased colonic stool, particularly in the proximal colon.      The radiologist's interpretation of all radiological studies have been reviewed by me.    COURSE & MEDICAL DECISION MAKING  Pertinent Labs & Imaging studies reviewed. (See chart for details)    8:47 PM - Patient seen and examined at bedside. Patient will be treated with 30mg toradol. Ordered abdomen XR, cbc with differential, comp metabolic panel, and other labs to evaluate her symptoms.     12:02 AM Patient reevaluated at bedside. Patient still slightly symptomatic.    12:31 AM Patient reevaluated at bedside. Patient feeling improved, is resting comfortably, and is in no acute distress. Discussed resulted studies. Discussed plan for discharge; I advised the patient to follow-up with primary care physician, and to return to the [facility] ED with any new or worsening symptoms, including fever, trouble breathing. Patient was given the opportunity for questions. I addressed all questions or concerns at this time and they verbalize agreement to the plan of care.    Medical Decision Making: At this point time I think the patient's abdominal distention and abdominal pain is likely secondary to constipation.  Patient had a recent CT at the beginning of the month that was negative for any acute findings.  Patient's labs are otherwise unremarkable.  She does show a urinary tract infection after questioning patient states that she did not take the antibiotics that were called into her.  She will be given IV Rocephin here and then antibiotic to go home with.  Patient's abdomen is nonsurgical I do not think a CT is warranted at this point time.    The patient will return for new or worsening symptoms and is stable at the time of discharge.    The patient is referred  to a primary physician for blood pressure management, diabetic screening, and for all other preventative health concerns.    DISPOSITION:  Patient will be discharged home in stable condition.    FOLLOW UP:  Your doctor    Schedule an appointment as soon as possible for a visit in 2 days      [address] [address]  [phone]    If you need a doctor    [address] [address] [phone]    If you need a doctor      OUTPATIENT MEDICATIONS:  Discharge Medication List as of 1/22/2019  1:04 AM      START taking these medications    Details   sulfamethoxazole-trimethoprim (BACTRIM DS) 800-160 MG tablet Take 1 Tab by mouth every 12 hours for 3 days., Disp-6 Tab, R-0, Print Rx Paper      polyethylene glycol 3350 (MIRALAX) Powder Take 17 g by mouth every day., Disp-1 Bottle, R-0, Print Rx Paper           FINAL IMPRESSION  1. Periumbilical abdominal pain    2. Acute UTI    3. Constipation, unspecified constipation type       I, Kinza Armstrong (Riaz), am scribing for, and in the presence of, Bjorn Lopez M.D.    Electronically signed by: Kinza Armstrong (Riaz), 1/21/2019    IBjorn M.D. personally performed the services described in this documentation, as scribed by Kinza Armstrong in my presence, and it is both accurate and complete.    The note accurately reflects work and decisions made by me.  Bjorn Lopez  1/22/2019  2:26 AM    C

## 2019-01-22 NOTE — ED NOTES
Hourly rounding performed. Assessed patient complaints and Bathroom/comfort needs.  Pt ambulated to the bathroom. no Noted difficulties with ambulation.

## 2019-01-25 NOTE — ED NOTES
ED Positive Culture Follow-up/Notification Note:    Date: 1/24/19     Patient seen in the ED on 1/21/2019 for periumbilical abdominal swelling and a previous urinary tract infection for which she did not pick .   1. Periumbilical abdominal pain    2. Acute UTI    3. Constipation, unspecified constipation type       Patient received ceftriaxone 2g iv once before discharge.   Discharge Medication List as of 1/22/2019  1:04 AM      START taking these medications    Details   sulfamethoxazole-trimethoprim (BACTRIM DS) 800-160 MG tablet Take 1 Tab by mouth every 12 hours for 3 days., Disp-6 Tab, R-0, Print Rx Paper      polyethylene glycol 3350 (MIRALAX) Powder Take 17 g by mouth every day., Disp-1 Bottle, R-0, Print Rx Paper             Allergies: Patient has no known allergies.     Vitals:    01/21/19 2151 01/21/19 2200 01/22/19 0000 01/22/19 0100   BP:       Pulse: 74 73 81 69   Resp:       Temp:       TempSrc:       SpO2: 95% 95% 93% 96%   Weight:           Final cultures:   Results     Procedure Component Value Units Date/Time    URINE CULTURE(NEW) [[id number]]  (Abnormal)  (Susceptibility) Collected:  01/21/19 4905    Order Status:  Completed Specimen:  Urine Updated:  01/24/19 1026     Significant Indicator POS (POS)     Source UR     Site -     Urine Culture - (A)      Escherichia coli  >100,000 cfu/mL   (A)    Culture & Susceptibility     ESCHERICHIA COLI     Antibiotic Sensitivity Microscan Unit Status    Ampicillin Sensitive <=8 mcg/mL Final    Method: SENSITIVITY, SYLVIE    Cefepime Sensitive <=8 mcg/mL Final    Method: SENSITIVITY, SYLVIE    Cefotaxime Sensitive <=2 mcg/mL Final    Method: SENSITIVITY, SYLVIE    Cefotetan Sensitive <=16 mcg/mL Final    Method: SENSITIVITY, SYLVIE    Ceftazidime Sensitive <=1 mcg/mL Final    Method: SENSITIVITY, SYLVIE    Ceftriaxone Sensitive <=8 mcg/mL Final    Method: SENSITIVITY, SYLVIE    Cefuroxime Sensitive <=4 mcg/mL Final    Method: SENSITIVITY, SYLVIE    Cephalothin Intermediate 16  mcg/mL Final    Method: SENSITIVITY, SYLVIE    Ciprofloxacin Sensitive <=1 mcg/mL Final    Method: SENSITIVITY, SYLVIE    Gentamicin Sensitive <=4 mcg/mL Final    Method: SENSITIVITY, SYLVIE    Levofloxacin Sensitive <=2 mcg/mL Final    Method: SENSITIVITY, SYLVIE    Nitrofurantoin Sensitive <=32 mcg/mL Final    Method: SENSITIVITY, SYLVIE    Pip/Tazobactam Sensitive <=16 mcg/mL Final    Method: SENSITIVITY, SYLVIE    Piperacillin Sensitive <=16 mcg/mL Final    Method: SENSITIVITY, SYLVIE    Tigecycline Sensitive <=2 mcg/mL Final    Method: SENSITIVITY, SYLVIE    Tobramycin Sensitive <=4 mcg/mL Final    Method: SENSITIVITY, SYLVIE    Trimeth/Sulfa Sensitive <=2/38 mcg/mL Final    Method: SENSITIVITY, SYLVIE                       URINALYSIS CULTURE, IF INDICATED [[id number]]  (Abnormal) Collected:  01/21/19 5920    Order Status:  Completed Specimen:  Urine Updated:  01/22/19 0013     Color Yellow     Character Clear     Specific Gravity 1.018     Ph 7.0     Glucose >=1000 (A) mg/dL      Ketones Negative mg/dL      Protein Negative mg/dL      Bilirubin Negative     Urobilinogen, Urine 0.2     Nitrite Positive (A)     Leukocyte Esterase Small (A)     Occult Blood Negative     Micro Urine Req Microscopic          Plan:   Appropriate antibiotic therapy prescribed. No changes required based upon culture result.  Sent letter to patient to notify of positive culture result and encourage compliance with prescribed antibiotics.     Rocio Milian, PharmD

## 2019-02-02 ENCOUNTER — HOSPITAL ENCOUNTER (EMERGENCY)
Facility: MEDICAL CENTER | Age: 54
End: 2019-02-02
Attending: EMERGENCY MEDICINE
Payer: MEDICARE

## 2019-02-02 VITALS
RESPIRATION RATE: 19 BRPM | DIASTOLIC BLOOD PRESSURE: 101 MMHG | HEART RATE: 87 BPM | WEIGHT: 182.32 LBS | TEMPERATURE: 99.2 F | BODY MASS INDEX: 33.55 KG/M2 | HEIGHT: 62 IN | SYSTOLIC BLOOD PRESSURE: 163 MMHG | OXYGEN SATURATION: 97 %

## 2019-02-02 DIAGNOSIS — I10 HYPERTENSION, UNSPECIFIED TYPE: ICD-10-CM

## 2019-02-02 DIAGNOSIS — Z91.148 NONADHERENCE TO MEDICATION: ICD-10-CM

## 2019-02-02 PROCEDURE — 99283 EMERGENCY DEPT VISIT LOW MDM: CPT

## 2019-02-02 NOTE — ED PROVIDER NOTES
"ED Provider Note    Scribed for Dr. Juan Carlos Arias M.D. by Denia Tesfaye. 2/2/2019  3:23 PM    Primary care provider: Pcp Pt States None  Means of arrival: Walk in  History obtained from: Patient  History limited by: patient's altered mental status(fixed delusion)    CHIEF COMPLAINT  Chief Complaint   Patient presents with   • Other     pt reports \"my paramedic doctor ricki said my blood is sulfuric and I should not take any drugs\".  Pt reports she stopped taking all her medications 8 days ago.   • Hypertension       HPI  Perla Maire is a 53 y.o. Female with a history of diabetes, hypertension, and psychiatric problems who presents to the Emergency Department with a chief complaint of \"I want them to monitor me for my medical.\" Patient reports that she has not been taking her daily maintenance medications for the last 8 days because \"My doctor nurse technician told me 'take no more of those pills you have because they are sulfuric acid, turn you blood to sulfuric acid.' Patient is able to communicate that this person, \"Ricki\" was in the [facility]. When prompted further as to exactly why she does not want to take her medications anymore, she answers \"I don't want to take drugs anymore.\"    When asked what she would like me to monitor, she says  \"My blood. Its no more , no more sign of blood, its clear blood, I don't want it sulfuric acid. I was hospitalized #58, born this year to trick me. I want you to let me stay here for monitor\"    Further history limited to patient's altered mental status.(fixed delusion), most likely secondary to being off of her psychiatric medications for the last several days.    REVIEW OF SYSTEMS  Pertinent positives include hypertensive, \"do not want to take anymore. ROS limited secondary to patient's altered mental status.(fixed delusion)  See HPI for further details.     PAST MEDICAL HISTORY   has a past medical history of Diabetes (HCC); Hypertension; and " "Psychiatric disorder.    SURGICAL HISTORY  patient denies any surgical history    SOCIAL HISTORY  Social History   Substance Use Topics   • Smoking status: Never Smoker   • Smokeless tobacco: Never Used   • Alcohol use No      History   Drug Use No       CURRENT MEDICATIONS  No current facility-administered medications for this encounter.     Current Outpatient Prescriptions:   •  polyethylene glycol 3350 (MIRALAX) Powder, Take 17 g by mouth every day., Disp: 1 Bottle, Rfl: 0  •  ondansetron (ZOFRAN ODT) 4 MG TABLET DISPERSIBLE, Take 1 Tab by mouth every 6 hours as needed., Disp: 10 Tab, Rfl: 0  •  MethylPREDNISolone (MEDROL DOSEPAK) 4 MG Tablet Therapy Pack, Use as directed, Disp: 1 Kit, Rfl: 0  •  ibuprofen (MOTRIN) 400 MG Tab, Take 1 Tab by mouth every 6 hours as needed., Disp: 24 Tab, Rfl: 1  •  lisinopril (PRINIVIL) 20 MG Tab, Take 20 mg by mouth every day., Disp: , Rfl:   •  metformin (GLUCOPHAGE) 850 MG Tab, Take 850 mg by mouth 2 times a day, with meals., Disp: , Rfl:   •  risperidone (RISPERDAL) 3 MG Tab, Take 3 mg by mouth 2 times a day., Disp: , Rfl:   •  aripiprazole (ABILIFY) 30 MG tablet, Take 30 mg by mouth every day., Disp: , Rfl:   •  divalproex (DEPAKOTE) 250 MG TBEC, Take 250 mg by mouth every 12 hours., Disp: , Rfl:   •  duloxetine (CYMBALTA) 60 MG CPEP delayed-release capsule, Take 60 mg by mouth every day., Disp: , Rfl:   •  loxapine (LOXITANE) 10 MG capsule, Take 10 mg by mouth 2 Times a Day., Disp: , Rfl:   •  AMBIEN 5 MG PO TABS, Take  by mouth every bedtime. prn, Disp: , Rfl:     ALLERGIES  No Known Allergies    PHYSICAL EXAM  VITAL SIGNS: /111   Pulse 89   Temp 37.3 °C (99.2 °F) (Temporal)   Resp 16   Ht 1.575 m (5' 2\")   Wt 82.7 kg (182 lb 5.1 oz)   LMP 01/01/2019   BMI 33.35 kg/m²     Constitutional: Alert in no apparent distress. Well apearing  HENT: Normocephalic, Atraumatic, Bilateral external ears normal. Nose normal.   Eyes:  Conjunctiva normal, non-icteric.   Lungs: " "Non-labored respirations  Skin: Warm, Dry, No erythema, No rash.   Neurologic: Alert, Grossly non-focal.   Psychiatric: patient has a fixed delusion about taking her medications and sulfuric acid in her blood, otherwise does not seem psychotic.    COURSE & MEDICAL DECISION MAKING  Pertinent Labs & Imaging studies reviewed. (See chart for details)    3:23 PM - Patient seen and examined at bedside. Patient has an extensive history of psychiatric problems. For unclear reasons, she has made the decision to discontinue all of her daily maintenance medications for the last 8 days. When prompted why, patient repeats delusion about sulfuric acid in her blood and not wanting to take her medications as she \"does not want to take drugs anymore.\" Patient does not appear psychotic at this time, just demonstrating a fixed delusion about being monitored here and the sulfuric acid in her blood. Patient is adamant about not taking her medications at this time. I discussed with her the risks related to this especially since she presented hypertensive today, however, she is adamant she does not want to take her medications, which is her decision. Patient has no medical complaints or concerns at this time. She will be discharged.    Decision Making:  Patient is somewhat hypertensive, but is insistent she does not want to take her medications anymore she has a delusion about having sulfuric acid in her blood caused by medications.  However otherwise she does not appear delusional or psychotic is not suicidal or homicidal, and I do not think fits criteria for legal hold encouraged to take her medications    The patient will return for new or worsening symptoms and is stable at the time of discharge.    The patient is referred to a primary physician for blood pressure management, diabetic screening, and for all other preventative health concerns.    DISPOSITION:  Patient will be discharged home in stable condition.    FOLLOW UP:  No " follow-up provider specified.    FINAL IMPRESSION  1. Nonadherence to medication    2. Hypertension, unspecified type          I, Denia Tesfaye (Scribe), am scribing for, and in the presence of, Juan Carlos Arias M.D..    Electronically signed by: Denia Tesfaye (Scribe), 2/2/2019    IJuan Carlos M.D. personally performed the services described in this documentation, as scribed by Denia Tesfaye in my presence, and it is both accurate and complete.    The note accurately reflects work and decisions made by me.  Juan Carlos Arias  2/2/2019  5:05 PM

## 2019-02-02 NOTE — ED TRIAGE NOTES
"Perla Marie 53 y.o. female ambulatory to triage for     Chief Complaint   Patient presents with   • Other     pt reports \"my paramedic doctor andreia said my blood is sulfuric and I should not take any drugs\".  Pt reports she stopped taking all her medications 8 days ago.   • Hypertension     Pt hypertensive and tachycardic in triage.  Pt reports she stopped ALL medications including psych medications and BP medications.  Charge RN aware of patient.  Pt denies any pain, cp, n/v, sob or ha.  Pt reports \"I am here to see if the sulfuric acid is out of my blood.\"  Pt denies any medical c/c.  BP (!) 167/110   Pulse (!) 106   Temp 36.7 °C (98.1 °F) (Temporal)   Ht 1.575 m (5' 2\")   Wt 82.7 kg (182 lb 5.1 oz)   LMP 01/01/2019   BMI 33.35 kg/m²     "

## 2019-02-02 NOTE — ED NOTES
"Vital signs rechecked, she stated \"no headache, weakness, or changes\" at this time  She continues to wait to go to [facility] room 27. She is up to date on poc, no questions or needs at this time   "

## 2019-02-02 NOTE — ED TRIAGE NOTES
"Pt also reports she called [facility] today \"and they said they won't transfer me because I'm medical.\"  "

## 2021-03-15 DIAGNOSIS — Z23 NEED FOR VACCINATION: ICD-10-CM

## 2023-02-15 ENCOUNTER — HOSPITAL ENCOUNTER (EMERGENCY)
Facility: MEDICAL CENTER | Age: 58
End: 2023-02-15
Attending: EMERGENCY MEDICINE
Payer: MEDICARE

## 2023-02-15 ENCOUNTER — APPOINTMENT (OUTPATIENT)
Dept: RADIOLOGY | Facility: MEDICAL CENTER | Age: 58
End: 2023-02-15
Attending: EMERGENCY MEDICINE
Payer: MEDICARE

## 2023-02-15 VITALS
SYSTOLIC BLOOD PRESSURE: 179 MMHG | RESPIRATION RATE: 16 BRPM | DIASTOLIC BLOOD PRESSURE: 99 MMHG | OXYGEN SATURATION: 97 % | HEART RATE: 88 BPM | BODY MASS INDEX: 26.21 KG/M2 | WEIGHT: 143.3 LBS | TEMPERATURE: 97.6 F

## 2023-02-15 DIAGNOSIS — R73.9 HYPERGLYCEMIA: ICD-10-CM

## 2023-02-15 DIAGNOSIS — H53.8 BLURRED VISION: ICD-10-CM

## 2023-02-15 LAB
ALBUMIN SERPL BCP-MCNC: 4.5 G/DL (ref 3.2–4.9)
ALBUMIN/GLOB SERPL: 1.4 G/DL
ALP SERPL-CCNC: 75 U/L (ref 30–99)
ALT SERPL-CCNC: 14 U/L (ref 2–50)
ANION GAP SERPL CALC-SCNC: 14 MMOL/L (ref 7–16)
AST SERPL-CCNC: 14 U/L (ref 12–45)
BASE EXCESS BLDV CALC-SCNC: 1 MMOL/L
BASOPHILS # BLD AUTO: 0.8 % (ref 0–1.8)
BASOPHILS # BLD: 0.09 K/UL (ref 0–0.12)
BILIRUB SERPL-MCNC: 0.5 MG/DL (ref 0.1–1.5)
BODY TEMPERATURE: 36 CENTIGRADE
BUN SERPL-MCNC: 17 MG/DL (ref 8–22)
CALCIUM ALBUM COR SERPL-MCNC: 9.2 MG/DL (ref 8.5–10.5)
CALCIUM SERPL-MCNC: 9.6 MG/DL (ref 8.5–10.5)
CHLORIDE SERPL-SCNC: 102 MMOL/L (ref 96–112)
CO2 SERPL-SCNC: 23 MMOL/L (ref 20–33)
CREAT SERPL-MCNC: 0.86 MG/DL (ref 0.5–1.4)
EKG IMPRESSION: NORMAL
EOSINOPHIL # BLD AUTO: 0.25 K/UL (ref 0–0.51)
EOSINOPHIL NFR BLD: 2.2 % (ref 0–6.9)
ERYTHROCYTE [DISTWIDTH] IN BLOOD BY AUTOMATED COUNT: 42 FL (ref 35.9–50)
GFR SERPLBLD CREATININE-BSD FMLA CKD-EPI: 78 ML/MIN/1.73 M 2
GLOBULIN SER CALC-MCNC: 3.3 G/DL (ref 1.9–3.5)
GLUCOSE BLD STRIP.AUTO-MCNC: 217 MG/DL (ref 65–99)
GLUCOSE SERPL-MCNC: 326 MG/DL (ref 65–99)
HCO3 BLDV-SCNC: 24 MMOL/L (ref 24–28)
HCT VFR BLD AUTO: 49 % (ref 37–47)
HGB BLD-MCNC: 16.4 G/DL (ref 12–16)
IMM GRANULOCYTES # BLD AUTO: 0.05 K/UL (ref 0–0.11)
IMM GRANULOCYTES NFR BLD AUTO: 0.4 % (ref 0–0.9)
LYMPHOCYTES # BLD AUTO: 2.85 K/UL (ref 1–4.8)
LYMPHOCYTES NFR BLD: 25.3 % (ref 22–41)
MAGNESIUM SERPL-MCNC: 1.9 MG/DL (ref 1.5–2.5)
MCH RBC QN AUTO: 30.6 PG (ref 27–33)
MCHC RBC AUTO-ENTMCNC: 33.5 G/DL (ref 33.6–35)
MCV RBC AUTO: 91.4 FL (ref 81.4–97.8)
MONOCYTES # BLD AUTO: 0.38 K/UL (ref 0–0.85)
MONOCYTES NFR BLD AUTO: 3.4 % (ref 0–13.4)
NEUTROPHILS # BLD AUTO: 7.63 K/UL (ref 2–7.15)
NEUTROPHILS NFR BLD: 67.9 % (ref 44–72)
NRBC # BLD AUTO: 0 K/UL
NRBC BLD-RTO: 0 /100 WBC
PCO2 BLDV: 36.4 MMHG (ref 41–51)
PH BLDV: 7.44 [PH] (ref 7.31–7.45)
PLATELET # BLD AUTO: 300 K/UL (ref 164–446)
PMV BLD AUTO: 9.4 FL (ref 9–12.9)
PO2 BLDV: 45.5 MMHG (ref 25–40)
POTASSIUM SERPL-SCNC: 3.8 MMOL/L (ref 3.6–5.5)
PROT SERPL-MCNC: 7.8 G/DL (ref 6–8.2)
RBC # BLD AUTO: 5.36 M/UL (ref 4.2–5.4)
SAO2 % BLDV: 79.9 %
SODIUM SERPL-SCNC: 139 MMOL/L (ref 135–145)
WBC # BLD AUTO: 11.3 K/UL (ref 4.8–10.8)

## 2023-02-15 PROCEDURE — 80053 COMPREHEN METABOLIC PANEL: CPT

## 2023-02-15 PROCEDURE — 36415 COLL VENOUS BLD VENIPUNCTURE: CPT

## 2023-02-15 PROCEDURE — 70450 CT HEAD/BRAIN W/O DYE: CPT

## 2023-02-15 PROCEDURE — 99284 EMERGENCY DEPT VISIT MOD MDM: CPT

## 2023-02-15 PROCEDURE — 85025 COMPLETE CBC W/AUTO DIFF WBC: CPT

## 2023-02-15 PROCEDURE — 82803 BLOOD GASES ANY COMBINATION: CPT

## 2023-02-15 PROCEDURE — 82962 GLUCOSE BLOOD TEST: CPT

## 2023-02-15 PROCEDURE — 700105 HCHG RX REV CODE 258: Performed by: EMERGENCY MEDICINE

## 2023-02-15 PROCEDURE — 700101 HCHG RX REV CODE 250

## 2023-02-15 PROCEDURE — 93005 ELECTROCARDIOGRAM TRACING: CPT | Performed by: EMERGENCY MEDICINE

## 2023-02-15 PROCEDURE — 83735 ASSAY OF MAGNESIUM: CPT

## 2023-02-15 RX ORDER — PROPARACAINE HYDROCHLORIDE 5 MG/ML
1 SOLUTION/ DROPS OPHTHALMIC ONCE
Status: DISCONTINUED | OUTPATIENT
Start: 2023-02-15 | End: 2023-02-15

## 2023-02-15 RX ORDER — SODIUM CHLORIDE, SODIUM LACTATE, POTASSIUM CHLORIDE, CALCIUM CHLORIDE 600; 310; 30; 20 MG/100ML; MG/100ML; MG/100ML; MG/100ML
1000 INJECTION, SOLUTION INTRAVENOUS ONCE
Status: COMPLETED | OUTPATIENT
Start: 2023-02-15 | End: 2023-02-15

## 2023-02-15 RX ORDER — TETRACAINE HYDROCHLORIDE 5 MG/ML
1 SOLUTION OPHTHALMIC ONCE
Status: COMPLETED | OUTPATIENT
Start: 2023-02-15 | End: 2023-02-15

## 2023-02-15 RX ADMIN — SODIUM CHLORIDE, POTASSIUM CHLORIDE, SODIUM LACTATE AND CALCIUM CHLORIDE 1000 ML: 600; 310; 30; 20 INJECTION, SOLUTION INTRAVENOUS at 17:33

## 2023-02-15 RX ADMIN — TETRACAINE HYDROCHLORIDE 1 DROP: 5 SOLUTION OPHTHALMIC at 17:15

## 2023-02-15 NOTE — ED TRIAGE NOTES
Perla Marie  57 y.o. female  Chief Complaint   Patient presents with    Blurred Vision     Both eyes X 2 weeks    Sent by MD     Patient sent by her PCP for vision changes. Patient reports a history of diabetes taking Metformin       Vitals:    02/15/23 1421   BP: (Abnormal) 167/108   Pulse: (Abnormal) 119   Resp: 18   Temp: 36 °C (96.8 °F)   SpO2: 97%       Triage process explained to patient, apologized for wait time, and returned to [facility].  Pt informed to notify staff of any change in condition.

## 2023-02-16 NOTE — ED NOTES
Visual acuity performed:   Left eye: 20/100  Right eye: 20/100  Bilateral: 20/100  Pt reports that she wears glasses at home but does not have them with her today.

## 2023-02-16 NOTE — ED NOTES
Discharge teaching and paperwork provided and all questions/concerns answered. VSS, assessment stable and PIV removed. Patient discharged to the care of self/sister and ambulated out of the ED.

## 2023-02-16 NOTE — ED PROVIDER NOTES
ED Provider Note    CHIEF COMPLAINT  Chief Complaint   Patient presents with    Blurred Vision     Both eyes X 2 weeks    Sent by MD     Patient sent by her PCP for vision changes. Patient reports a history of diabetes taking Metformin       EXTERNAL RECORDS REVIEWED  External ED Note [facility] with psychiatric diagnosis    HPI/ROS  LIMITATION TO HISTORY   none  OUTSIDE HISTORIAN(S):  Family Perla Marie is a 57 y.o. female who presents with blurry vision.  Patient reports she has had blurry vision over at least the last 2 weeks.  She is not sure if it is worsening, then it may have been there longer than this.  It is in both eyes.  Not a particular visual field just all over the eyes.  She reports no eye pain.  Reports no headaches.  Reports no focal weakness numbness or tingling.  No abdominal pain, nausea or vomiting    PAST MEDICAL HISTORY   has a past medical history of Diabetes (HCC), Hypertension, and Psychiatric disorder.    SURGICAL HISTORY  patient denies any surgical history    FAMILY HISTORY  History reviewed. No pertinent family history.    SOCIAL HISTORY  Social History     Tobacco Use    Smoking status: Never    Smokeless tobacco: Never   Substance and Sexual Activity    Alcohol use: No    Drug use: No    Sexual activity: Not on file       CURRENT MEDICATIONS  Home Medications    **Home medications have not yet been reviewed for this encounter**         ALLERGIES  No Known Allergies    PHYSICAL EXAM  VITAL SIGNS: BP (!) 179/99   Pulse 88 Comment: Simultaneous filing. User may not have seen previous data.  Temp 36.4 °C (97.6 °F) (Temporal) Comment: Simultaneous filing. User may not have seen previous data. Comment (Src): Simultaneous filing. User may not have seen previous data.  Resp 16 Comment: Simultaneous filing. User may not have seen previous data.  Wt 65 kg (143 lb 4.8 oz)   LMP 01/01/2019   SpO2 97% Comment: Simultaneous filing. User may not have seen previous data.  BMI  26.21 kg/m²    Constitutional: Alert in no apparent distress.  HENT: No signs of trauma, Bilateral external ears normal, Nose normal.   Eyes: Pupils are equal and reactive, Conjunctiva normal, Non-icteric.   Neck: Normal range of motion, No tenderness, Supple, No stridor.   Cardiovascular: Tachycardic, regular rhythm, no murmurs.   Thorax & Lungs: Normal breath sounds, No respiratory distress, No wheezing, No chest tenderness.   Abdomen: Bowel sounds normal, Soft, No tenderness, No masses, No pulsatile masses. No peritoneal signs.  Skin: Warm, Dry, No erythema, No rash.   Back: No bony tenderness, No CVA tenderness.   Extremities: Intact distal pulses, No edema, No tenderness, No cyanosis, Negative Lauren's sign.  Musculoskeletal: Good range of motion in all major joints. No tenderness to palpation or major deformities noted.   Neurologic: Alert, cranial nerves intact, speech is appropriate or not slurred, upper extremities bilaterally exhibit no drift, no dysmetria, 5 out of 5 strength with bilateral bicep/tricep/, sensation intact to light touch throughout upper extremities. Lower extremities strength 5 out of 5 thigh extension/flexion/abduction/adduction, knee extension/flexion, dorsiflexion plantar flexion. No clonus.  2+ patella reflexes.  sensation intact to light touch.  No focal deficits noted. Ambulates with steady gait, Psychiatric: Affect normal, Judgment normal, Mood normal.     Visual acuity, 20/100 on the left, 20/100 on the right, 20/100 with both, she does wear glasses but does not currently have them on her    Intraocular pressure is 20 on the right 22 on the left    Slit-lamp exam is unremarkable, Lids and lashes are clear and without lesion or foreign body, no ocular lesions noted      DIAGNOSTIC STUDIES / PROCEDURES  Results for orders placed or performed during the hospital encounter of 02/15/23   CBC WITH DIFFERENTIAL   Result Value Ref Range    WBC 11.3 (H) 4.8 - 10.8 K/uL    RBC 5.36 4.20  - 5.40 M/uL    Hemoglobin 16.4 (H) 12.0 - 16.0 g/dL    Hematocrit 49.0 (H) 37.0 - 47.0 %    MCV 91.4 81.4 - 97.8 fL    MCH 30.6 27.0 - 33.0 pg    MCHC 33.5 (L) 33.6 - 35.0 g/dL    RDW 42.0 35.9 - 50.0 fL    Platelet Count 300 164 - 446 K/uL    MPV 9.4 9.0 - 12.9 fL    Neutrophils-Polys 67.90 44.00 - 72.00 %    Lymphocytes 25.30 22.00 - 41.00 %    Monocytes 3.40 0.00 - 13.40 %    Eosinophils 2.20 0.00 - 6.90 %    Basophils 0.80 0.00 - 1.80 %    Immature Granulocytes 0.40 0.00 - 0.90 %    Nucleated RBC 0.00 /100 WBC    Neutrophils (Absolute) 7.63 (H) 2.00 - 7.15 K/uL    Lymphs (Absolute) 2.85 1.00 - 4.80 K/uL    Monos (Absolute) 0.38 0.00 - 0.85 K/uL    Eos (Absolute) 0.25 0.00 - 0.51 K/uL    Baso (Absolute) 0.09 0.00 - 0.12 K/uL    Immature Granulocytes (abs) 0.05 0.00 - 0.11 K/uL    NRBC (Absolute) 0.00 K/uL   COMP METABOLIC PANEL   Result Value Ref Range    Sodium 139 135 - 145 mmol/L    Potassium 3.8 3.6 - 5.5 mmol/L    Chloride 102 96 - 112 mmol/L    Co2 23 20 - 33 mmol/L    Anion Gap 14.0 7.0 - 16.0    Glucose 326 (H) 65 - 99 mg/dL    Bun 17 8 - 22 mg/dL    Creatinine 0.86 0.50 - 1.40 mg/dL    Calcium 9.6 8.5 - 10.5 mg/dL    AST(SGOT) 14 12 - 45 U/L    ALT(SGPT) 14 2 - 50 U/L    Alkaline Phosphatase 75 30 - 99 U/L    Total Bilirubin 0.5 0.1 - 1.5 mg/dL    Albumin 4.5 3.2 - 4.9 g/dL    Total Protein 7.8 6.0 - 8.2 g/dL    Globulin 3.3 1.9 - 3.5 g/dL    A-G Ratio 1.4 g/dL   MAGNESIUM   Result Value Ref Range    Magnesium 1.9 1.5 - 2.5 mg/dL   VENOUS BLOOD GAS   Result Value Ref Range    Venous Bg Ph 7.44 7.31 - 7.45    Venous Bg Pco2 36.4 (L) 41.0 - 51.0 mmHg    Venous Bg Po2 45.5 (H) 25.0 - 40.0 mmHg    Venous Bg O2 Saturation 79.9 %    Venous Bg Hco3 24 24 - 28 mmol/L    Venous Bg Base Excess 1 mmol/L    Body Temp 36.0 Centigrade   CORRECTED CALCIUM   Result Value Ref Range    Correct Calcium 9.2 8.5 - 10.5 mg/dL   ESTIMATED GFR   Result Value Ref Range    GFR (CKD-EPI) 78 >60 mL/min/1.73 m 2   EKG   Result Value  Ref Range    Report       [facility] Emergency Dept.    Test Date:  2023-02-15  Pt Name:    DEAN CAMPOS                   Department: ER  MRN:        [id number]                      Room:       CO 79  Gender:     Female                       Technician: [id number]  :        1965                   Requested By:CARLOTA MCKINNEY  Order #:    [id number]                    Reading MD: CARLOTA MCKINNEY MD    Measurements  Intervals                                Axis  Rate:       107                          P:          144  CO:         149                          QRS:        -31  QRSD:       79                           T:          -14  QT:         308  QTc:        411    Interpretive Statements  Sinus tachycardia  Probable left atrial enlargement  Inferior infarct, old  Lead(s) without pickup  Compared to ECG 2019 22:42:10  Sinus rhythm no longer present  Left anterior fascicular block no longer present  Electronically Signed On 2- 18:38:10 PST by CARLOTA MCKINNEY MD     POCT glucose device results   Result Value Ref Range    POC Glucose, Blood 217 (H) 65 - 99 mg/dL         RADIOLOGY  I have independently interpreted the diagnostic imaging associated with this visit and am waiting the final reading from the radiologist.   My preliminary interpretation is a follows: no bleed  Radiologist interpretation:   CT-HEAD W/O   Final Result      No acute intracranial hemorrhage, mass effect, or hydrocephalus.            COURSE & MEDICAL DECISION MAKING    ED Observation Status? Yes; I am placing the patient in to an observation status due to a diagnostic uncertainty as well as therapeutic intensity. Patient placed in observation status at 4:01 PM, 2/15/2023.     Observation plan is as follows: Diagnostic evaluation as below, monitoring of blood sugar    Upon Reevaluation, the patient's condition has: Improved; and will be discharged.    Patient discharged from ED Observation status at 640 PM (Time)  "02/15/23   (Date).     INITIAL ASSESSMENT, COURSE AND PLAN  Care Narrative: 4:01 PM  Patient is presenting with blurry vision.  At this point differential includes metabolic disturbance such as hyperglycemia.  She has no focal deficits to suggest CVA at this time,    I discussed the case with Dr. Hodge her primary doctor who sent her in today.  He reports that her vision there was 20/200 not 20/100 as it is here.  Was a note of some psychiatric illness as well also he is unsure how reliable this reading was.     6:39 PM  Patient is reevaluated and updated on all results, she reports she is feeling \"normal\" we will plan for discharge    HYDRATION: Based on the patient's presentation of Hyperglycemia the patient was given IV fluids. IV Hydration was used because oral hydration was not as rapid as required. Upon recheck following hydration, the patient was improved.      ADDITIONAL PROBLEM LIST  #1 hyperglycemia.  Patient with baseline diabetes found to be hyperglycemic here.  She was given IV fluids with improvement in this.  There is no evidence of DKA or other significant metabolic disturbance.  We will follow-up with primary care for continued management of this    #2 blurry vision.  Patient was sent from primary care as she did have 20/200 vision there apparently that has changed over the last 2 weeks or potentially longer.  At this point no findings of emergent ocular pathology, her pressures are normal, seems highly unlikely to be intraocular lesion such as retinal detachment given the bilateral nature and her exam here.  Her blood pressure was mildly elevated here but she has no other focal neurologic findings to suggest CVA at this time, CT is normal as well.  Certainly potential for diabetic retinopathy given her poorly controlled diabetes and she will be referred to ophthalmology      DISPOSITION AND DISCUSSIONS  I have discussed management of the patient with the following physicians and NIDIA's: Dr. Hodge " her primary care      Barriers to care at this time, including but not limited to:  none .     Decision tools and prescription drugs considered including, but not limited to:     he patient will return for new or worsening symptoms and is stable at the time of discharge.    The patient is referred to a primary physician for blood pressure management, diabetic screening, and for all other preventative health concerns.      DISPOSITION:  Patient will be discharged home in stable condition.    FOLLOW UP:  Genaro Hodge M.D.  [address] [facility] [id number] [phone]    Schedule an appointment as soon as possible for a visit       Mann Parra M.D.  [address] [facility] [id number] [phone]    Schedule an appointment as soon as possible for a visit         OUTPATIENT MEDICATIONS:  Discharge Medication List as of 2/15/2023  7:10 PM            FINAL DIAGNOSIS  1. Blurred vision    2. Hyperglycemia           Electronically signed by: Richard Correa M.D., 2/15/2023 4:01 PM